# Patient Record
Sex: MALE | Race: BLACK OR AFRICAN AMERICAN | NOT HISPANIC OR LATINO | Employment: OTHER | URBAN - METROPOLITAN AREA
[De-identification: names, ages, dates, MRNs, and addresses within clinical notes are randomized per-mention and may not be internally consistent; named-entity substitution may affect disease eponyms.]

---

## 2022-10-12 PROBLEM — E11.9 DIABETES: Status: ACTIVE | Noted: 2022-10-12

## 2022-10-12 PROBLEM — I10 HYPERTENSION: Chronic | Status: ACTIVE | Noted: 2022-10-12

## 2022-10-12 PROBLEM — I63.519 CEREBROVASCULAR ACCIDENT (CVA) DUE TO OCCLUSION OF MIDDLE CEREBRAL ARTERY: Status: ACTIVE | Noted: 2022-10-12

## 2022-10-14 PROBLEM — N17.9 AKI (ACUTE KIDNEY INJURY): Status: ACTIVE | Noted: 2022-10-14

## 2022-10-17 PROBLEM — J38.4 LARYNGEAL EDEMA: Status: ACTIVE | Noted: 2022-10-17

## 2022-10-17 PROBLEM — N17.9 AKI (ACUTE KIDNEY INJURY): Status: RESOLVED | Noted: 2022-10-14 | Resolved: 2022-10-17

## 2022-10-17 PROBLEM — I63.512 ACUTE ISCHEMIC LEFT MCA STROKE: Status: ACTIVE | Noted: 2022-10-17

## 2022-10-17 PROBLEM — R47.01 APHASIA: Status: ACTIVE | Noted: 2022-10-17

## 2022-10-17 PROBLEM — Z99.11 ON MECHANICALLY ASSISTED VENTILATION: Status: ACTIVE | Noted: 2022-10-17

## 2022-10-17 PROBLEM — I61.0 NONTRAUMATIC SUBCORTICAL HEMORRHAGE OF LEFT CEREBRAL HEMISPHERE: Status: ACTIVE | Noted: 2022-10-17

## 2022-10-17 PROBLEM — E66.01 CLASS 2 SEVERE OBESITY DUE TO EXCESS CALORIES WITH SERIOUS COMORBIDITY AND BODY MASS INDEX (BMI) OF 36.0 TO 36.9 IN ADULT: Status: ACTIVE | Noted: 2022-10-17

## 2022-10-17 PROBLEM — G81.91 ACUTE RIGHT HEMIPARESIS: Status: ACTIVE | Noted: 2022-10-17

## 2022-10-17 PROBLEM — R41.4 HEMINEGLECT: Status: ACTIVE | Noted: 2022-10-17

## 2022-10-20 PROBLEM — R63.39 FEEDING DIFFICULTY IN ADULT: Status: ACTIVE | Noted: 2022-10-20

## 2022-10-20 PROBLEM — I69.391 DYSPHAGIA DUE TO RECENT CEREBROVASCULAR ACCIDENT: Status: ACTIVE | Noted: 2022-10-20

## 2022-10-20 PROBLEM — E87.0 HYPERNATREMIA: Status: ACTIVE | Noted: 2022-10-20

## 2022-10-21 PROBLEM — J69.0 ASPIRATION PNEUMONIA: Status: ACTIVE | Noted: 2022-10-21

## 2022-10-22 PROBLEM — A41.9 SEVERE SEPSIS: Status: ACTIVE | Noted: 2022-10-22

## 2022-10-22 PROBLEM — R73.9 HYPERGLYCEMIA: Status: ACTIVE | Noted: 2022-10-22

## 2022-10-22 PROBLEM — R40.2430 GLASGOW COMA SCALE TOTAL SCORE 3-8: Status: ACTIVE | Noted: 2022-10-22

## 2022-10-22 PROBLEM — R65.20 SEVERE SEPSIS: Status: ACTIVE | Noted: 2022-10-22

## 2022-10-24 PROBLEM — I10 PRIMARY HYPERTENSION: Status: ACTIVE | Noted: 2022-10-24

## 2022-10-24 PROBLEM — J96.01 ACUTE HYPOXEMIC RESPIRATORY FAILURE: Status: ACTIVE | Noted: 2022-10-24

## 2022-10-24 PROBLEM — I63.9 CRYPTOGENIC STROKE: Status: ACTIVE | Noted: 2022-10-17

## 2022-10-25 PROBLEM — I63.9 CRYPTOGENIC STROKE: Status: RESOLVED | Noted: 2022-10-17 | Resolved: 2022-10-25

## 2022-10-25 PROBLEM — Z99.11 ON MECHANICALLY ASSISTED VENTILATION: Status: RESOLVED | Noted: 2022-10-17 | Resolved: 2022-10-25

## 2022-10-25 PROBLEM — J38.4 LARYNGEAL EDEMA: Status: RESOLVED | Noted: 2022-10-17 | Resolved: 2022-10-25

## 2022-10-25 PROBLEM — R41.4 HEMINEGLECT: Status: RESOLVED | Noted: 2022-10-17 | Resolved: 2022-10-25

## 2022-10-25 PROBLEM — R63.39 FEEDING DIFFICULTY IN ADULT: Status: RESOLVED | Noted: 2022-10-20 | Resolved: 2022-10-25

## 2022-10-25 PROBLEM — R40.2430 GLASGOW COMA SCALE TOTAL SCORE 3-8: Status: RESOLVED | Noted: 2022-10-22 | Resolved: 2022-10-25

## 2022-10-25 PROBLEM — R73.9 HYPERGLYCEMIA: Status: RESOLVED | Noted: 2022-10-22 | Resolved: 2022-10-25

## 2022-10-27 PROBLEM — R65.21 SEPTIC SHOCK: Status: ACTIVE | Noted: 2022-10-22

## 2022-10-27 PROBLEM — R13.10 DYSPHAGIA: Status: ACTIVE | Noted: 2022-10-20

## 2022-10-29 PROBLEM — R31.0 GROSS HEMATURIA: Status: ACTIVE | Noted: 2022-10-29

## 2022-10-30 PROBLEM — Z93.1 S/P PERCUTANEOUS ENDOSCOPIC GASTROSTOMY (PEG) TUBE PLACEMENT: Status: ACTIVE | Noted: 2022-10-30

## 2022-11-01 PROBLEM — R65.21 SEPTIC SHOCK: Status: RESOLVED | Noted: 2022-10-22 | Resolved: 2022-11-01

## 2022-11-01 PROBLEM — J96.01 ACUTE HYPOXEMIC RESPIRATORY FAILURE: Status: RESOLVED | Noted: 2022-10-24 | Resolved: 2022-11-01

## 2022-11-01 PROBLEM — A41.9 SEPTIC SHOCK: Status: RESOLVED | Noted: 2022-10-22 | Resolved: 2022-11-01

## 2023-02-06 PROBLEM — J69.0 ASPIRATION PNEUMONIA: Status: RESOLVED | Noted: 2022-10-21 | Resolved: 2023-02-06

## 2023-02-06 PROBLEM — I63.512 ACUTE ISCHEMIC LEFT MCA STROKE: Status: RESOLVED | Noted: 2022-10-17 | Resolved: 2023-02-06

## 2023-02-06 PROBLEM — I69.391 DYSPHAGIA DUE TO RECENT CEREBROVASCULAR ACCIDENT: Status: RESOLVED | Noted: 2022-10-20 | Resolved: 2023-02-06

## 2023-02-06 PROBLEM — I63.519 CEREBROVASCULAR ACCIDENT (CVA) DUE TO OCCLUSION OF MIDDLE CEREBRAL ARTERY: Status: RESOLVED | Noted: 2022-10-12 | Resolved: 2023-02-06

## 2024-01-25 ENCOUNTER — HOSPITAL ENCOUNTER (INPATIENT)
Facility: HOSPITAL | Age: 64
LOS: 5 days | Discharge: HOSPICE | DRG: 871 | End: 2024-01-30
Attending: EMERGENCY MEDICINE | Admitting: INTERNAL MEDICINE
Payer: MEDICARE

## 2024-01-25 DIAGNOSIS — T83.511A URINARY TRACT INFECTION ASSOCIATED WITH CATHETERIZATION OF URINARY TRACT, UNSPECIFIED INDWELLING URINARY CATHETER TYPE, INITIAL ENCOUNTER: Primary | ICD-10-CM

## 2024-01-25 DIAGNOSIS — A41.9 SEPSIS, DUE TO UNSPECIFIED ORGANISM, UNSPECIFIED WHETHER ACUTE ORGAN DYSFUNCTION PRESENT: ICD-10-CM

## 2024-01-25 DIAGNOSIS — Z13.9 SCREENING DUE: ICD-10-CM

## 2024-01-25 DIAGNOSIS — N39.0 URINARY TRACT INFECTION ASSOCIATED WITH CATHETERIZATION OF URINARY TRACT, UNSPECIFIED INDWELLING URINARY CATHETER TYPE, INITIAL ENCOUNTER: Primary | ICD-10-CM

## 2024-01-25 DIAGNOSIS — R94.31 QT PROLONGATION: ICD-10-CM

## 2024-01-25 LAB
ALBUMIN SERPL-MCNC: 1.8 G/DL (ref 3.4–4.8)
ALBUMIN/GLOB SERPL: 0.3 RATIO (ref 1.1–2)
ALP SERPL-CCNC: 114 UNIT/L (ref 40–150)
ALT SERPL-CCNC: 12 UNIT/L (ref 0–55)
APPEARANCE UR: CLEAR
AST SERPL-CCNC: 20 UNIT/L (ref 5–34)
BACTERIA #/AREA URNS AUTO: ABNORMAL /HPF
BILIRUB SERPL-MCNC: 0.7 MG/DL
BILIRUB UR QL STRIP.AUTO: NEGATIVE
BNP BLD-MCNC: <10 PG/ML
BUN SERPL-MCNC: 23.7 MG/DL (ref 8.4–25.7)
CALCIUM SERPL-MCNC: 8.7 MG/DL (ref 8.8–10)
CHLORIDE SERPL-SCNC: 110 MMOL/L (ref 98–107)
CHOLEST SERPL-MCNC: 77 MG/DL
CHOLEST/HDLC SERPL: 2 {RATIO} (ref 0–5)
CK MB SERPL-MCNC: 2.6 NG/ML
CK SERPL-CCNC: 515 U/L (ref 30–200)
CO2 SERPL-SCNC: 20 MMOL/L (ref 23–31)
COLOR UR AUTO: YELLOW
CREAT SERPL-MCNC: 0.49 MG/DL (ref 0.73–1.18)
CRP SERPL-MCNC: 269.9 MG/L
ERYTHROCYTE [SEDIMENTATION RATE] IN BLOOD: >130 MM/HR (ref 0–15)
EST. AVERAGE GLUCOSE BLD GHB EST-MCNC: 122.6 MG/DL
GFR SERPLBLD CREATININE-BSD FMLA CKD-EPI: >60 MLS/MIN/1.73/M2
GLOBULIN SER-MCNC: 5.9 GM/DL (ref 2.4–3.5)
GLUCOSE SERPL-MCNC: 88 MG/DL (ref 82–115)
GLUCOSE UR QL STRIP.AUTO: ABNORMAL
HBA1C MFR BLD: 5.9 %
HDLC SERPL-MCNC: 34 MG/DL (ref 35–60)
HOLD SPECIMEN: NORMAL
HYALINE CASTS #/AREA URNS LPF: ABNORMAL /LPF
KETONES UR QL STRIP.AUTO: ABNORMAL
LACTATE SERPL-SCNC: 1.5 MMOL/L (ref 0.5–2.2)
LDLC SERPL CALC-MCNC: 33 MG/DL (ref 50–140)
LEUKOCYTE ESTERASE UR QL STRIP.AUTO: 250
LIPASE SERPL-CCNC: <4 U/L
MAGNESIUM SERPL-MCNC: 2.2 MG/DL (ref 1.6–2.6)
MUCOUS THREADS URNS QL MICRO: ABNORMAL /LPF
NITRITE UR QL STRIP.AUTO: NEGATIVE
PH UR STRIP.AUTO: 5.5 [PH]
POCT GLUCOSE: 130 MG/DL (ref 70–110)
POCT GLUCOSE: 93 MG/DL (ref 70–110)
POTASSIUM SERPL-SCNC: 3.9 MMOL/L (ref 3.5–5.1)
PROT SERPL-MCNC: 7.7 GM/DL (ref 5.8–7.6)
PROT UR QL STRIP.AUTO: ABNORMAL
RBC #/AREA URNS AUTO: ABNORMAL /HPF
RBC UR QL AUTO: NEGATIVE
SODIUM SERPL-SCNC: 142 MMOL/L (ref 136–145)
SP GR UR STRIP.AUTO: 1.03 (ref 1–1.03)
SQUAMOUS #/AREA URNS LPF: ABNORMAL /HPF
TRIGL SERPL-MCNC: 48 MG/DL (ref 34–140)
TROPONIN I SERPL-MCNC: 0.04 NG/ML (ref 0–0.04)
UNIDENT CRYS #/AREA URNS HPF: ABNORMAL /HPF
UROBILINOGEN UR STRIP-ACNC: ABNORMAL
VLDLC SERPL CALC-MCNC: 10 MG/DL
WBC #/AREA URNS AUTO: ABNORMAL /HPF
YEAST BUDDING URNS QL: ABNORMAL /HPF

## 2024-01-25 PROCEDURE — 85652 RBC SED RATE AUTOMATED: CPT | Performed by: EMERGENCY MEDICINE

## 2024-01-25 PROCEDURE — 80053 COMPREHEN METABOLIC PANEL: CPT | Performed by: EMERGENCY MEDICINE

## 2024-01-25 PROCEDURE — 82553 CREATINE MB FRACTION: CPT | Performed by: EMERGENCY MEDICINE

## 2024-01-25 PROCEDURE — 87040 BLOOD CULTURE FOR BACTERIA: CPT

## 2024-01-25 PROCEDURE — 96360 HYDRATION IV INFUSION INIT: CPT

## 2024-01-25 PROCEDURE — 25000003 PHARM REV CODE 250

## 2024-01-25 PROCEDURE — 93005 ELECTROCARDIOGRAM TRACING: CPT

## 2024-01-25 PROCEDURE — 86140 C-REACTIVE PROTEIN: CPT | Performed by: EMERGENCY MEDICINE

## 2024-01-25 PROCEDURE — 81001 URINALYSIS AUTO W/SCOPE: CPT

## 2024-01-25 PROCEDURE — 25000003 PHARM REV CODE 250: Performed by: EMERGENCY MEDICINE

## 2024-01-25 PROCEDURE — 21400001 HC TELEMETRY ROOM

## 2024-01-25 PROCEDURE — 99285 EMERGENCY DEPT VISIT HI MDM: CPT | Mod: 25

## 2024-01-25 PROCEDURE — 87077 CULTURE AEROBIC IDENTIFY: CPT

## 2024-01-25 PROCEDURE — 63600175 PHARM REV CODE 636 W HCPCS

## 2024-01-25 PROCEDURE — 11000001 HC ACUTE MED/SURG PRIVATE ROOM

## 2024-01-25 PROCEDURE — 80061 LIPID PANEL: CPT

## 2024-01-25 PROCEDURE — 83036 HEMOGLOBIN GLYCOSYLATED A1C: CPT

## 2024-01-25 PROCEDURE — 82550 ASSAY OF CK (CPK): CPT | Performed by: EMERGENCY MEDICINE

## 2024-01-25 PROCEDURE — 25000003 PHARM REV CODE 250: Performed by: STUDENT IN AN ORGANIZED HEALTH CARE EDUCATION/TRAINING PROGRAM

## 2024-01-25 PROCEDURE — 84484 ASSAY OF TROPONIN QUANT: CPT | Performed by: EMERGENCY MEDICINE

## 2024-01-25 PROCEDURE — 83735 ASSAY OF MAGNESIUM: CPT | Performed by: EMERGENCY MEDICINE

## 2024-01-25 PROCEDURE — 83880 ASSAY OF NATRIURETIC PEPTIDE: CPT | Performed by: EMERGENCY MEDICINE

## 2024-01-25 PROCEDURE — 83605 ASSAY OF LACTIC ACID: CPT | Performed by: EMERGENCY MEDICINE

## 2024-01-25 PROCEDURE — 83690 ASSAY OF LIPASE: CPT | Performed by: EMERGENCY MEDICINE

## 2024-01-25 PROCEDURE — 84134 ASSAY OF PREALBUMIN: CPT | Performed by: STUDENT IN AN ORGANIZED HEALTH CARE EDUCATION/TRAINING PROGRAM

## 2024-01-25 RX ORDER — SODIUM CHLORIDE 0.9 % (FLUSH) 0.9 %
10 SYRINGE (ML) INJECTION
Status: DISCONTINUED | OUTPATIENT
Start: 2024-01-25 | End: 2024-01-30 | Stop reason: HOSPADM

## 2024-01-25 RX ORDER — ATORVASTATIN CALCIUM 40 MG/1
40 TABLET, FILM COATED ORAL DAILY
Status: DISCONTINUED | OUTPATIENT
Start: 2024-01-25 | End: 2024-01-26

## 2024-01-25 RX ORDER — SODIUM CHLORIDE, SODIUM LACTATE, POTASSIUM CHLORIDE, CALCIUM CHLORIDE 600; 310; 30; 20 MG/100ML; MG/100ML; MG/100ML; MG/100ML
INJECTION, SOLUTION INTRAVENOUS CONTINUOUS
Status: DISCONTINUED | OUTPATIENT
Start: 2024-01-25 | End: 2024-01-30 | Stop reason: HOSPADM

## 2024-01-25 RX ORDER — HYDROCODONE BITARTRATE AND ACETAMINOPHEN 5; 325 MG/1; MG/1
1 TABLET ORAL EVERY 4 HOURS PRN
Status: DISCONTINUED | OUTPATIENT
Start: 2024-01-25 | End: 2024-01-30 | Stop reason: HOSPADM

## 2024-01-25 RX ORDER — CITALOPRAM 10 MG/1
20 TABLET ORAL DAILY
Status: DISCONTINUED | OUTPATIENT
Start: 2024-01-26 | End: 2024-01-26

## 2024-01-25 RX ORDER — METOPROLOL TARTRATE 25 MG/1
25 TABLET, FILM COATED ORAL 2 TIMES DAILY
Status: DISCONTINUED | OUTPATIENT
Start: 2024-01-26 | End: 2024-01-26

## 2024-01-25 RX ORDER — ACETAMINOPHEN 325 MG/1
650 TABLET ORAL EVERY 8 HOURS PRN
Status: DISCONTINUED | OUTPATIENT
Start: 2024-01-25 | End: 2024-01-30 | Stop reason: HOSPADM

## 2024-01-25 RX ORDER — GABAPENTIN 300 MG/1
600 CAPSULE ORAL 2 TIMES DAILY
Status: DISCONTINUED | OUTPATIENT
Start: 2024-01-26 | End: 2024-01-26

## 2024-01-25 RX ORDER — NAPROXEN SODIUM 220 MG/1
81 TABLET, FILM COATED ORAL DAILY
Status: DISCONTINUED | OUTPATIENT
Start: 2024-01-26 | End: 2024-01-26

## 2024-01-25 RX ORDER — IBUPROFEN 200 MG
24 TABLET ORAL
Status: DISCONTINUED | OUTPATIENT
Start: 2024-01-25 | End: 2024-01-30 | Stop reason: HOSPADM

## 2024-01-25 RX ORDER — INSULIN ASPART 100 [IU]/ML
0-5 INJECTION, SOLUTION INTRAVENOUS; SUBCUTANEOUS
Status: DISCONTINUED | OUTPATIENT
Start: 2024-01-25 | End: 2024-01-30 | Stop reason: HOSPADM

## 2024-01-25 RX ORDER — IBUPROFEN 200 MG
16 TABLET ORAL
Status: DISCONTINUED | OUTPATIENT
Start: 2024-01-25 | End: 2024-01-30 | Stop reason: HOSPADM

## 2024-01-25 RX ORDER — GLUCAGON 1 MG
1 KIT INJECTION
Status: DISCONTINUED | OUTPATIENT
Start: 2024-01-25 | End: 2024-01-30 | Stop reason: HOSPADM

## 2024-01-25 RX ORDER — TALC
6 POWDER (GRAM) TOPICAL NIGHTLY PRN
Status: DISCONTINUED | OUTPATIENT
Start: 2024-01-25 | End: 2024-01-30 | Stop reason: HOSPADM

## 2024-01-25 RX ORDER — LISINOPRIL 2.5 MG/1
2.5 TABLET ORAL DAILY
Status: DISCONTINUED | OUTPATIENT
Start: 2024-01-26 | End: 2024-01-26

## 2024-01-25 RX ORDER — MUPIROCIN 20 MG/G
OINTMENT TOPICAL 2 TIMES DAILY
Status: COMPLETED | OUTPATIENT
Start: 2024-01-25 | End: 2024-01-30

## 2024-01-25 RX ADMIN — SODIUM CHLORIDE, POTASSIUM CHLORIDE, SODIUM LACTATE AND CALCIUM CHLORIDE: 600; 310; 30; 20 INJECTION, SOLUTION INTRAVENOUS at 12:01

## 2024-01-25 RX ADMIN — MUPIROCIN 1 G: 20 OINTMENT TOPICAL at 08:01

## 2024-01-25 RX ADMIN — ATORVASTATIN CALCIUM 40 MG: 40 TABLET, FILM COATED ORAL at 12:01

## 2024-01-25 RX ADMIN — SODIUM CHLORIDE 1000 ML: 9 INJECTION, SOLUTION INTRAVENOUS at 08:01

## 2024-01-25 RX ADMIN — INSULIN DETEMIR 5 UNITS: 100 INJECTION, SOLUTION SUBCUTANEOUS at 08:01

## 2024-01-25 RX ADMIN — PIPERACILLIN SODIUM AND TAZOBACTAM SODIUM 4.5 G: 4; .5 INJECTION, POWDER, LYOPHILIZED, FOR SOLUTION INTRAVENOUS at 04:01

## 2024-01-25 RX ADMIN — VANCOMYCIN HYDROCHLORIDE 2000 MG: 500 INJECTION, POWDER, LYOPHILIZED, FOR SOLUTION INTRAVENOUS at 04:01

## 2024-01-25 NOTE — H&P
[facility] Medicine Wards History & Physical Note     Resident Team: [facility] Medicine List 3  Attending Physician: Ba Lee MD  Resident: Art Saunders  Intern: Chano Ely    Date of Admit: 1/25/2024    Chief Complaint     Skin Ulcer and urinary problems (Transferred from [place] due to sacral ulcer and uti. Yadav present upon arrival with cloudy urine and also sacral ulcer noted. O2 at 88% RA. O2 @ 2l/nc in place ems. )     Subjective:      History of Present Illness:  Fermin Garcia is a 64 y.o. male with a past medical history of CVA (2020), aphasia, hypertension, neurogenic who was transferred to our emergency department [facility] for surgical services.  Patient was sent to outside ED from SNF due to altered mental and hypotension.  On review of paperwork from outside facility, it appears that patient presented to the outside facility initially hypotensive at 88/55.  He was given 2 L NS with improvement in his blood pressure to 110/72.  Labs from the outside facility displayed a leukocytosis at 18 with left shift, microcytic anemia with a H/H of 9/29.6 (MCV 72), normal lactate of 1.9, elevated troponin 113.1 pg/mL.  UA indicative of UTI.  Chest x-ray interpreted no acute cardiopulmonary process.  CT abdomen and pelvis significant for large decubitus ulcer overlying distal aspect of sacrum with evidence of bony erosion and extension of infectious process into the presacral space.    On admission, patient awake and alert but aphasic and unable to provide any significant information about his condition.  Contacted patient's sister Kaila Trent ([phone]) stated the patient has been bed ridden in a nursing home stroke in 2020.  She states that sacral ulcer has been present for some time now and has been actively enlarging.  She was unable to provide more information on the patient's condition or current health status.    Past Medical History:  Past Medical History:   Diagnosis Date    Hypertension   "   Stroke        Past Surgical History:  History reviewed. No pertinent surgical history.    Family History:  History reviewed. No pertinent family history.    Social History:  Social History     Tobacco Use    Smoking status: Unknown   Substance Use Topics    Alcohol use: Not Currently    Drug use: Not Currently       Allergies:  Review of patient's allergies indicates:  No Known Allergies    Home Medications:  Prior to Admission medications    Medication Sig Start Date End Date Taking? Authorizing Provider   amitriptyline (ELAVIL) 25 MG tablet Take 25 mg by mouth every evening.    Provider, Historical   atorvastatin (LIPITOR) 40 MG tablet Take 40 mg by mouth once daily.    Provider, Historical   carvediloL (COREG) 12.5 MG tablet Take 12.5 mg by mouth 2 (two) times daily with meals.    Provider, Historical   EScitalopram oxalate (LEXAPRO) 10 MG tablet 1 tablet (10 mg total) by Per G Tube route nightly. 22  Marcelino Moreau MD   ezetimibe (ZETIA) 10 mg tablet Take 10 mg by mouth once daily.    Provider, Historical   gabapentin (NEURONTIN) 600 MG tablet Take 1 tablet (600 mg total) by mouth 2 (two) times daily. 22  Marcelino Moreau MD   insulin aspart U-100 (NOVOLOG) 100 unit/mL injection Inject 0-5 Units into the skin 3 (three) times daily with meals. 22  Marcelino Moreau MD   insulin detemir U-100 (LEVEMIR) 100 unit/mL injection Inject 17 Units into the skin once daily. 22  Marcelino Moreau MD   lisinopriL 10 MG tablet Take 10 mg by mouth once daily.    Provider, Historical     Review of Systems:  Review of Systems   Unable to perform ROS: Language       Objective:   Last 24 Hour Vital Signs:  BP  Min: 122/76  Max: 135/77  Temp  Av.6 °F (37 °C)  Min: 98.6 °F (37 °C)  Max: 98.6 °F (37 °C)  Pulse  Av  Min: 113  Max: 115  Resp  Av  Min: 24  Max: 24  SpO2  Av.8 %  Min: 97 %  Max: 99 %  Height  Av' 8" (172.7 cm)  Min: 5' 8" (172.7 cm)  Max: " "5' 8" (172.7 cm)  Weight  Av.2 kg (185 lb 11.1 oz)  Min: 83.5 kg (184 lb)  Max: 85 kg (187 lb 6.3 oz)  Body mass index is 27.98 kg/m².  No intake/output data recorded.    Physical Examination:  Physical Exam  Vitals reviewed.   Constitutional:       General: He is not in acute distress.  Cardiovascular:      Rate and Rhythm: Regular rhythm. Tachycardia present.      Pulses: Normal pulses.      Heart sounds: No murmur heard.     No friction rub. No gallop.   Pulmonary:      Effort: Pulmonary effort is normal.      Breath sounds: Rhonchi present. No wheezing or rales.   Abdominal:      General: There is no distension.      Palpations: Abdomen is soft.      Tenderness: There is no abdominal tenderness.      Comments: PEG tube place   Musculoskeletal:      Comments: There is a large sacral ulcer with visible bone w/ no evidence of necrotic tissue   There are several wounds of bilateral lower extremities   Skin:     General: Skin is warm and dry.   Neurological:      Mental Status: He is alert.      Comments: Patient difficult to understand due to aphasia  Patient is able to answer yes/no questions   Psychiatric:         Mood and Affect: Mood normal.         Behavior: Behavior normal.       Laboratory:  Most Recent Data:  CBC:   Lab Results   Component Value Date    WBC 11.14 2022    HGB 11.7 (L) 2022    HCT 36.3 (L) 2022     2022    MCV 82 2022    RDW 12.2 2022     WBC Differential: No results for input(s): "WBC", "HGB", "HCT", "PLT", "MCV" in the last 168 hours.  BMP:   Lab Results   Component Value Date     2024    K 3.9 2024     2022    CO2 20 (L) 2024    BUN 23.7 2024    CREATININE 0.49 (L) 2024     (H) 2022    CALCIUM 8.7 (L) 2024    MG 2.20 2024    PHOS 3.8 2022     LFTs:   Lab Results   Component Value Date    PROT 6.3 10/26/2022    ALBUMIN 1.8 (L) 2024    BILITOT 0.7 2024    " "AST 20 01/25/2024    ALKPHOS 114 01/25/2024    ALT 12 01/25/2024     Coags:   Lab Results   Component Value Date    INR 1.17 10/20/2022     FLP:   Lab Results   Component Value Date    CHOL 238 (H) 10/13/2022    HDL 53 10/13/2022    LDLCALC 167.0 (H) 10/13/2022    TRIG 90 10/13/2022    CHOLHDL 22.3 10/13/2022     DM:   Lab Results   Component Value Date    HGBA1C 5.9 01/25/2024    HGBA1C 9.4 (H) 10/13/2022    LDLCALC 167.0 (H) 10/13/2022    CREATININE 0.49 (L) 01/25/2024     Thyroid: No results found for: "TSH", "FREET4", "I9QOZHJ", "I1IVXWG", "THYROIDAB"  Anemia:   Lab Results   Component Value Date    IOXJALPL20 727 10/13/2022    FOLATE 16.4 10/13/2022     Cardiac:   Lab Results   Component Value Date    TROPONINI 0.044 01/25/2024     Urinalysis:   Lab Results   Component Value Date    COLORU Yellow 10/22/2022    SPECGRAV >=1.030 (A) 10/22/2022    NITRITE Negative 10/22/2022    KETONESU Negative 10/22/2022    UROBILINOGEN 1.0 10/22/2022    WBCUA 4 10/22/2022       Trended Lab Data:  Recent Labs   Lab 01/25/24  1429      K 3.9   CO2 20*   BUN 23.7   CREATININE 0.49*   ALBUMIN 1.8*   BILITOT 0.7   AST 20   ALKPHOS 114   ALT 12       Trended Cardiac Data:  Recent Labs   Lab 01/25/24  1424   TROPONINI 0.044       Radiology:  Imaging Results              X-Ray Chest AP Portable (Final result)  Result time 01/25/24 08:36:43      Final result by Aneesh Ahumada MD (01/25/24 08:36:43)                   Impression:      No acute cardiopulmonary process.      Electronically signed by: Aneesh Ahumada  Date:    01/25/2024  Time:    08:36               Narrative:    EXAMINATION:  XR CHEST AP PORTABLE    CLINICAL HISTORY:  Encounter for screening, unspecified    TECHNIQUE:  Single view of the chest    COMPARISON:  10/21/2022    FINDINGS:  No focal opacification, pleural effusion, or pneumothorax.    The cardiomediastinal silhouette is within normal limits.    No acute osseous abnormality.                                "       Assessment & Plan:     1) Sepsis secondary to sacral ulcer vs. urosepsis    - SIRS 3/4; qSOFA 2/3 on admit  - patient with a large sacral ulcer and multiple wounds to bilateral lower extremities  -CT abdomen pelvis displayed ulcer with extension infectious processes to presacral space and bony erosion  - patient also noticed to have increased urine sediment in Yadav  - UA from outside ED indicative of UTI, repeating UA and urine culture  - patient originally hypotensive upon arrival to outside ED but responded to fluid resuscitation  - initial lactate 0.9  - leukocytosis 19 initially at outside facility, repeating labs  - wound care consulted  - patient started on broad-spectrum antibiotics with vancomycin and Zosyn (day 1)  - blood cultures ordered and pending  - we will consider surgery consult if family/patient opts for diverting colostomy    2) AMS      Hx of MCA stroke      Aphasia   - patient initially presented to the ED from nursing home due to altered mental status  - according to records from nursing home, this is patient's baseline (answer yes/no questions and is difficult to understand)  - obtain CT head, consider MRI brain  - we will continue aspirin if CT unremarkable    3) HTN  - currently holding home antihypertensives due to initial hypotension secondary to sepsis  - continue home lisinopril, metoprolol when appropriate    4) HLD  - continue home atorvastatin 40 mg daily  - repeat lipid panel ordered    5) T2DM  -SSI  -Detemir 5U    CODE STATUS: Full (has [place]POST)  Access:PIV  Antibiotics: Vanc and Zosyn  Diet: PEG feeds  DVT Prophylaxis: SCD  GI Prophylaxis: N/A  Fluids: LR @ 100 mL/hr     Disposition:  Patient admitted to the Internal Medicine team for sepsis secondary to sacral ulcer versus urosepsis.  Started on broad-spectrum antibiotics and blood cultures pending.    Chano Ely, DO  Internal Medicine HO-1

## 2024-01-25 NOTE — ED PROVIDER NOTES
Encounter Date: 1/25/2024       History     Chief Complaint   Patient presents with    Skin Ulcer    urinary problems     Transferred from [place] due to sacral ulcer and uti. Yadav present upon arrival with cloudy urine and also sacral ulcer noted. O2 at 88% RA. O2 @ 2l/nc in place ems.      Patient presented to OSH yesterday for hypotension in SNF; found obvious UTI and apparently infected sacral decubitus ulcer at OSH. Patient was improved with treatments targeting sepsis at first facility, but hospitalists there had been unwilling to accept patient to facility given the sacral wound and absence of surgery service on call.OSH data include significant leukocytosis, NSTEMI. Received 30 cc/kg crystalloid, zosyn (8pm) and vanco (3am). Arrives here eyes open, appropriate responses to simple questions, following simple commands ;        Review of patient's allergies indicates:  No Known Allergies  Past Medical History:   Diagnosis Date    Hypertension     Stroke      History reviewed. No pertinent surgical history.  History reviewed. No pertinent family history.  Social History     Tobacco Use    Smoking status: Unknown   Substance Use Topics    Alcohol use: Not Currently    Drug use: Not Currently     Review of Systems   Reason unable to perform ROS: mental status insufficient.       Physical Exam     Initial Vitals [01/25/24 0730]   BP Pulse Resp Temp SpO2   124/70 (!) 113 (!) 24 98.6 °F (37 °C) 97 %      MAP       --         Physical Exam    Nursing note and vitals reviewed.  Constitutional: He is not diaphoretic. No distress.   HENT:   Head: Normocephalic and atraumatic.   Eyes: EOM are normal. Pupils are equal, round, and reactive to light. Right eye exhibits no discharge. Left eye exhibits no discharge.   Neck: Neck supple. No thyromegaly present. No tracheal deviation present. No JVD present.   Normal range of motion.  Cardiovascular:  Regular rhythm, normal heart sounds and intact distal pulses.           No  murmur heard.  Pulmonary/Chest: No stridor. No respiratory distress. He has wheezes. He has no rhonchi. He has no rales.   Abdominal: Abdomen is soft. He exhibits no distension. There is no abdominal tenderness. There is no rebound and no guarding.   Musculoskeletal:         General: No tenderness or edema. Normal range of motion.      Cervical back: Normal range of motion and neck supple.     Neurological: He is alert and oriented to person, place, and time. He has normal strength. No cranial nerve deficit. GCS score is 15. GCS eye subscore is 4. GCS verbal subscore is 5. GCS motor subscore is 6.   Skin: Skin is warm and dry. Capillary refill takes less than 2 seconds. No rash and no abscess noted. No erythema. No pallor.   Stage IV sacral decubitus ulcer ;   Psychiatric: He has a normal mood and affect. His behavior is normal. Judgment and thought content normal.         ED Course   Critical Care    Date/Time: 1/25/2024 4:58 PM    Performed by: Mario Romano MD  Authorized by: Ba Lee MD  Direct patient critical care time: 30 minutes  Additional history critical care time: 5 minutes  Ordering / reviewing critical care time: 5 minutes  Documentation critical care time: 5 minutes  Consulting other physicians critical care time: 5 minutes  Total critical care time (exclusive of procedural time) : 50 minutes  Critical care time was exclusive of separately billable procedures and treating other patients and teaching time.  Critical care was necessary to treat or prevent imminent or life-threatening deterioration of the following conditions: circulatory failure and shock.  Critical care was time spent personally by me on the following activities: blood draw for specimens, development of treatment plan with patient or surrogate, discussions with consultants, discussions with primary provider, interpretation of cardiac output measurements, evaluation of patient's response to treatment, examination of  patient, obtaining history from patient or surrogate, ordering and performing treatments and interventions, ordering and review of laboratory studies, ordering and review of radiographic studies, pulse oximetry, re-evaluation of patient's condition and review of old charts.        Labs Reviewed   BLOOD CULTURE OLG   BLOOD CULTURE OLG   CBC W/ AUTO DIFFERENTIAL    Narrative:     The following orders were created for panel order CBC auto differential.  Procedure                               Abnormality         Status                     ---------                               -----------         ------                     CBC with Differential[[id number]]                                                        Please view results for these tests on the individual orders.   CBC WITH DIFFERENTIAL   URINALYSIS, REFLEX TO URINE CULTURE   POCT GLUCOSE MONITORING CONTINUOUS     EKG Readings: (Independently Interpreted)   - ST @ 114, non acute and non ischemic appearing ;     ECG Results              EKG 12-lead (In process)  Result time 01/25/24 08:11:54      In process by Interface, Lab In [facility] (01/25/24 08:11:54)                   Narrative:    Test Reason : Z13.9,    Vent. Rate : 114 BPM     Atrial Rate : 114 BPM     P-R Int : 162 ms          QRS Dur : 074 ms      QT Int : 334 ms       P-R-T Axes : 064 020 076 degrees     QTc Int : 460 ms    Sinus tachycardia  Cannot rule out Inferior infarct (cited on or before 25-JAN-2024)  T wave abnormality, consider anterolateral ischemia  Abnormal ECG  When compared with ECG of 18-OCT-2022 05:23,  Vent. rate has increased BY  50 BPM  Questionable change in initial forces of Inferior leads  Nonspecific T wave abnormality now evident in Inferior leads  T wave inversion now evident in Anterior-lateral leads    Referred By: HELLEN PHILLIPS           Confirmed By:                                   Imaging Results              X-Ray Chest AP Portable (Final result)  Result time  01/25/24 08:36:43      Final result by Aneesh Ahumada MD (01/25/24 08:36:43)                   Impression:      No acute cardiopulmonary process.      Electronically signed by: Aneesh Ahumada  Date:    01/25/2024  Time:    08:36               Narrative:    EXAMINATION:  XR CHEST AP PORTABLE    CLINICAL HISTORY:  Encounter for screening, unspecified    TECHNIQUE:  Single view of the chest    COMPARISON:  10/21/2022    FINDINGS:  No focal opacification, pleural effusion, or pneumothorax.    The cardiomediastinal silhouette is within normal limits.    No acute osseous abnormality.                                    X-Rays:   Independently Interpreted Readings:   Other Readings:  - OSH imaging reviewed; observe significant sacral decubitae ;    Medications   sodium chloride 0.9% flush 10 mL (has no administration in time range)   melatonin tablet 6 mg (has no administration in time range)   acetaminophen tablet 650 mg (has no administration in time range)   HYDROcodone-acetaminophen 5-325 mg per tablet 1 tablet (has no administration in time range)   atorvastatin tablet 40 mg (40 mg Oral Given 1/25/24 1234)   glucose chewable tablet 16 g (has no administration in time range)   glucose chewable tablet 24 g (has no administration in time range)   glucagon (human recombinant) injection 1 mg (has no administration in time range)   insulin aspart U-100 injection 0-5 Units (has no administration in time range)   dextrose 10% bolus 125 mL 125 mL (has no administration in time range)   dextrose 10% bolus 250 mL 250 mL (has no administration in time range)   lactated ringers infusion ( Intravenous New Bag 1/25/24 1235)   piperacillin-tazobactam (ZOSYN) 4.5 g in dextrose 5 % in water (D5W) 100 mL IVPB (MB+) (4.5 g Intravenous New Bag 1/25/24 1624)   aspirin chewable tablet 81 mg (has no administration in time range)   mupirocin 2 % ointment (has no administration in time range)   citalopram tablet 20 mg (has no administration  in time range)   gabapentin capsule 600 mg (has no administration in time range)   lisinopriL tablet 2.5 mg (has no administration in time range)   metoprolol tartrate (LOPRESSOR) tablet 25 mg (has no administration in time range)   insulin detemir U-100 injection 5 Units (has no administration in time range)   vancomycin (VANCOCIN) 2,000 mg in dextrose 5 % (D5W) 500 mL IVPB (2,000 mg Intravenous New Bag 1/25/24 1629)   vancomycin 1,500 mg in dextrose 5 % (D5W) 250 mL IVPB (Vial-Mate) (has no administration in time range)   sodium chloride 0.9% bolus 1,000 mL 1,000 mL (0 mLs Intravenous Stopped 1/25/24 0921)     Medical Decision Making  Patient presents with features suggestive of severe sepsis in setting of probable urinary source. Ddc includes urosepsis, renal failure, possible sepsis from sacral decubitus ulcer, SIRS, others ...    Amount and/or Complexity of Data Reviewed  Independent Historian: EMS  External Data Reviewed: radiology and notes.  Labs: ordered. Decision-making details documented in ED Course.  Radiology: ordered.  ECG/medicine tests: ordered and independent interpretation performed. Decision-making details documented in ED Course.     Details: - ST @ 114, non acute and non ischemic appearing ;  Discussion of management or test interpretation with external provider(s): In patient medicine team aware of case, plan admit ;    Risk  Decision regarding hospitalization.  Risk Details: Risk found sufficient to warrant admission for further evaluation, supervision of clinical course ;               ED Course as of 01/25/24 1704   Thu Jan 25, 2024   1657 Significantly elevated crp ; [CT]   1657 Moderately elevated cpk ; [CT]   1657 Reassuring chemistries ; [CT]   1657 Significantly elevated CRP ; [CT]      ED Course User Index  [CT] Mario Romano MD                           Clinical Impression:  Final diagnoses:  [Z13.9] Screening due  [T83.511A, N39.0] Urinary tract infection associated with  catheterization of urinary tract, unspecified indwelling urinary catheter type, initial encounter (Primary)  [A41.9] Sepsis, due to unspecified organism, unspecified whether acute organ dysfunction present          ED Disposition Condition    Admit                 Mario Romano MD  01/25/24 8092

## 2024-01-25 NOTE — PROGRESS NOTES
"Pharmacokinetic Initial Assessment: IV Vancomycin    Assessment/Plan:    Initiate intravenous vancomycin with loading dose of 2000 mg once followed by a maintenance dose of vancomycin 1500mg IV every 12 hours  Desired empiric serum trough concentration is 15 to 20 mcg/mL  Draw vancomycin trough level 60 min prior to fourth dose on 1/27 at approximately 0400  Pharmacy will continue to follow and monitor vancomycin.      Please contact pharmacy at extension [phone] with any questions regarding this assessment.     Thank you for the consult,   Mariza Fields       Patient brief summary:  Fermin Garcia is a 64 y.o. male initiated on antimicrobial therapy with IV Vancomycin for treatment of suspected bacteremia    Drug Allergies:   Review of patient's allergies indicates:  No Known Allergies    Actual Body Weight:   83.5kg    Renal Function:   Estimated Creatinine Clearance: 156.1 mL/min (A) (based on SCr of 0.49 mg/dL (L)).,     CBC (last 72 hours):  Recent Labs   Lab Result Units 01/25/24  1424   Hemoglobin A1c % 5.9       Metabolic Panel (last 72 hours):  Recent Labs   Lab Result Units 01/25/24  1429   Sodium Level mmol/L 142   Potassium Level mmol/L 3.9   Chloride mmol/L 110*   Carbon Dioxide mmol/L 20*   Glucose Level mg/dL 88   Blood Urea Nitrogen mg/dL 23.7   Creatinine mg/dL 0.49*   Albumin Level g/dL 1.8*   Bilirubin Total mg/dL 0.7   Alkaline Phosphatase unit/L 114   Aspartate Aminotransferase unit/L 20   Alanine Aminotransferase unit/L 12   Magnesium Level mg/dL 2.20       Drug levels (last 3 results):  No results for input(s): "VANCOMYCINRA", "VANCORANDOM", "VANCOMYCINPE", "VANCOPEAK", "VANCOMYCINTR", "VANCOTROUGH" in the last 72 hours.    Microbiologic Results:  Microbiology Results (last 7 days)       Procedure Component Value Units Date/Time    Blood Culture #2 **CANNOT BE ORDERED STAT** [[id number]] Collected: 01/25/24 1421    Order Status: Sent Specimen: Blood from Hand, Left Updated: 01/25/24 1427 "    Blood Culture #1 **CANNOT BE ORDERED STAT** [[id number]] Collected: 01/25/24 1346    Order Status: Sent Specimen: Blood from Hand, Right Updated: 01/25/24 1427    Blood Culture #2 **CANNOT BE ORDERED STAT** [[id number]]     Order Status: Canceled Specimen: Blood, Venous     Blood Culture #1 **CANNOT BE ORDERED STAT** [[id number]]     Order Status: Canceled Specimen: Blood, Venous

## 2024-01-25 NOTE — ED NOTES
I SPOKE TO JUAN C IN PHARM. HE SAID DO NOT GIVE ANTIBIOTICS  YET DUE TO BLOOD CULTURES NOT DONE YET AND UNSURE OF HOW WE WILL OBTAIN BLOOD CULTURE RESULTS FROM [facility].

## 2024-01-26 LAB
ALBUMIN SERPL-MCNC: 1.6 G/DL (ref 3.4–4.8)
ALBUMIN/GLOB SERPL: 0.3 RATIO (ref 1.1–2)
ALP SERPL-CCNC: 115 UNIT/L (ref 40–150)
ALT SERPL-CCNC: 10 UNIT/L (ref 0–55)
ANION GAP SERPL CALC-SCNC: 8 MEQ/L
ANION GAP SERPL CALC-SCNC: 9 MEQ/L
AST SERPL-CCNC: 18 UNIT/L (ref 5–34)
AV INDEX (PROSTH): 0.51
AV MEAN GRADIENT: 12 MMHG
AV PEAK GRADIENT: 25 MMHG
AV VALVE AREA BY VELOCITY RATIO: 1.4 CM²
AV VALVE AREA: 1.54 CM²
AV VELOCITY RATIO: 0.46
BASOPHILS # BLD AUTO: 0.04 X10(3)/MCL
BASOPHILS NFR BLD AUTO: 0.2 %
BILIRUB SERPL-MCNC: 0.6 MG/DL
BSA FOR ECHO PROCEDURE: 2 M2
BUN SERPL-MCNC: 11.2 MG/DL (ref 8.4–25.7)
BUN SERPL-MCNC: 15.9 MG/DL (ref 8.4–25.7)
BUN SERPL-MCNC: 8.1 MG/DL (ref 8.4–25.7)
CALCIUM SERPL-MCNC: 8 MG/DL (ref 8.8–10)
CALCIUM SERPL-MCNC: 8.3 MG/DL (ref 8.8–10)
CALCIUM SERPL-MCNC: 8.5 MG/DL (ref 8.8–10)
CHLORIDE SERPL-SCNC: 105 MMOL/L (ref 98–107)
CHLORIDE SERPL-SCNC: 108 MMOL/L (ref 98–107)
CHLORIDE SERPL-SCNC: 111 MMOL/L (ref 98–107)
CO2 SERPL-SCNC: 21 MMOL/L (ref 23–31)
CO2 SERPL-SCNC: 23 MMOL/L (ref 23–31)
CO2 SERPL-SCNC: 25 MMOL/L (ref 23–31)
CREAT SERPL-MCNC: 0.44 MG/DL (ref 0.73–1.18)
CREAT SERPL-MCNC: 0.49 MG/DL (ref 0.73–1.18)
CREAT SERPL-MCNC: 0.5 MG/DL (ref 0.73–1.18)
CREAT/UREA NIT SERPL: 18
CREAT/UREA NIT SERPL: 22
CRP SERPL-MCNC: 204.4 MG/L
CV ECHO LV RWT: 0.72 CM
DOP CALC AO PEAK VEL: 2.51 M/S
DOP CALC AO VTI: 39.9 CM
DOP CALC LVOT AREA: 3 CM2
DOP CALC LVOT DIAMETER: 1.97 CM
DOP CALC LVOT PEAK VEL: 1.15 M/S
DOP CALC LVOT STROKE VOLUME: 61.54 CM3
DOP CALC MV VTI: 16.8 CM
DOP CALCLVOT PEAK VEL VTI: 20.2 CM
E WAVE DECELERATION TIME: 137.87 MSEC
E/A RATIO: 0.64
E/E' RATIO: 6 M/S
ECHO LV POSTERIOR WALL: 1.43 CM (ref 0.6–1.1)
EOSINOPHIL # BLD AUTO: 0.29 X10(3)/MCL (ref 0–0.9)
EOSINOPHIL NFR BLD AUTO: 1.3 %
ERYTHROCYTE [DISTWIDTH] IN BLOOD BY AUTOMATED COUNT: 17.3 % (ref 11.5–17)
EST. AVERAGE GLUCOSE BLD GHB EST-MCNC: 122.6 MG/DL
FRACTIONAL SHORTENING: 29 % (ref 28–44)
GFR SERPLBLD CREATININE-BSD FMLA CKD-EPI: >60 MLS/MIN/1.73/M2
GLOBULIN SER-MCNC: 5 GM/DL (ref 2.4–3.5)
GLUCOSE SERPL-MCNC: 103 MG/DL (ref 82–115)
GLUCOSE SERPL-MCNC: 63 MG/DL (ref 82–115)
GLUCOSE SERPL-MCNC: 97 MG/DL (ref 82–115)
HBA1C MFR BLD: 5.9 %
HCT VFR BLD AUTO: 25.9 % (ref 42–52)
HGB BLD-MCNC: 7.7 G/DL (ref 14–18)
HOLD SPECIMEN: NORMAL
HR MV ECHO: 99 BPM
IMM GRANULOCYTES # BLD AUTO: 0.14 X10(3)/MCL (ref 0–0.04)
IMM GRANULOCYTES NFR BLD AUTO: 0.6 %
INTERVENTRICULAR SEPTUM: 1.27 CM (ref 0.6–1.1)
IRON SATN MFR SERPL: 15 % (ref 20–50)
IRON SERPL-MCNC: 15 UG/DL (ref 65–175)
LEFT ATRIUM SIZE: 3.88 CM
LEFT INTERNAL DIMENSION IN SYSTOLE: 2.83 CM (ref 2.1–4)
LEFT VENTRICLE DIASTOLIC VOLUME INDEX: 35.26 ML/M2
LEFT VENTRICLE DIASTOLIC VOLUME: 69.46 ML
LEFT VENTRICLE MASS INDEX: 100 G/M2
LEFT VENTRICLE SYSTOLIC VOLUME INDEX: 15.4 ML/M2
LEFT VENTRICLE SYSTOLIC VOLUME: 30.37 ML
LEFT VENTRICULAR INTERNAL DIMENSION IN DIASTOLE: 3.99 CM (ref 3.5–6)
LEFT VENTRICULAR MASS: 196.87 G
LV LATERAL E/E' RATIO: 4.07 M/S
LV SEPTAL E/E' RATIO: 11.4 M/S
LVOT MG: 2.55 MMHG
LVOT MV: 0.73 CM/S
LYMPHOCYTES # BLD AUTO: 2.3 X10(3)/MCL (ref 0.6–4.6)
LYMPHOCYTES NFR BLD AUTO: 10.6 %
MAGNESIUM SERPL-MCNC: 1.8 MG/DL (ref 1.6–2.6)
MAGNESIUM SERPL-MCNC: 1.8 MG/DL (ref 1.6–2.6)
MAGNESIUM SERPL-MCNC: 2 MG/DL (ref 1.6–2.6)
MCH RBC QN AUTO: 22.6 PG (ref 27–31)
MCHC RBC AUTO-ENTMCNC: 29.7 G/DL (ref 33–36)
MCV RBC AUTO: 76.2 FL (ref 80–94)
MONOCYTES # BLD AUTO: 1.84 X10(3)/MCL (ref 0.1–1.3)
MONOCYTES NFR BLD AUTO: 8.5 %
MV MEAN GRADIENT: 2 MMHG
MV PEAK A VEL: 0.89 M/S
MV PEAK E VEL: 0.57 M/S
MV PEAK GRADIENT: 5 MMHG
MV STENOSIS PRESSURE HALF TIME: 39.98 MS
MV VALVE AREA BY CONTINUITY EQUATION: 3.66 CM2
MV VALVE AREA P 1/2 METHOD: 5.5 CM2
NEUTROPHILS # BLD AUTO: 16.99 X10(3)/MCL (ref 2.1–9.2)
NEUTROPHILS NFR BLD AUTO: 78.8 %
NRBC BLD AUTO-RTO: 0 %
OHS LV EJECTION FRACTION SIMPSONS BIPLANE MOD: 69 %
PHOSPHATE SERPL-MCNC: 2.2 MG/DL (ref 2.3–4.7)
PHOSPHATE SERPL-MCNC: 2.4 MG/DL (ref 2.3–4.7)
PHOSPHATE SERPL-MCNC: 3 MG/DL (ref 2.3–4.7)
PISA MRMAX VEL: 4.32 M/S
PISA TR MAX VEL: 2.79 M/S
PLATELET # BLD AUTO: 421 X10(3)/MCL (ref 130–400)
PMV BLD AUTO: 9.4 FL (ref 7.4–10.4)
POCT GLUCOSE: 100 MG/DL (ref 70–110)
POCT GLUCOSE: 105 MG/DL (ref 70–110)
POCT GLUCOSE: 78 MG/DL (ref 70–110)
POTASSIUM SERPL-SCNC: 3.2 MMOL/L (ref 3.5–5.1)
POTASSIUM SERPL-SCNC: 3.3 MMOL/L (ref 3.5–5.1)
POTASSIUM SERPL-SCNC: 3.3 MMOL/L (ref 3.5–5.1)
PREALB SERPL-MCNC: 6 MG/DL (ref 16–42)
PROT SERPL-MCNC: 6.6 GM/DL (ref 5.8–7.6)
RA MAJOR: 3 CM
RA PRESSURE ESTIMATED: 3 MMHG
RBC # BLD AUTO: 3.4 X10(6)/MCL (ref 4.7–6.1)
RV TB RVSP: 6 MMHG
SINUS: 3.8 CM
SODIUM SERPL-SCNC: 138 MMOL/L (ref 136–145)
SODIUM SERPL-SCNC: 140 MMOL/L (ref 136–145)
SODIUM SERPL-SCNC: 142 MMOL/L (ref 136–145)
TDI LATERAL: 0.14 M/S
TDI SEPTAL: 0.05 M/S
TDI: 0.1 M/S
TIBC SERPL-MCNC: 101 UG/DL (ref 250–450)
TIBC SERPL-MCNC: 86 UG/DL (ref 69–240)
TR MAX PG: 31 MMHG
TRANSFERRIN SERPL-MCNC: 78 MG/DL (ref 163–344)
TRICUSPID ANNULAR PLANE SYSTOLIC EXCURSION: 2.82 CM
TV REST PULMONARY ARTERY PRESSURE: 34 MMHG
WBC # SPEC AUTO: 21.6 X10(3)/MCL (ref 4.5–11.5)
Z-SCORE OF LEFT VENTRICULAR DIMENSION IN END DIASTOLE: -3.5
Z-SCORE OF LEFT VENTRICULAR DIMENSION IN END SYSTOLE: -1.63

## 2024-01-26 PROCEDURE — 25000003 PHARM REV CODE 250

## 2024-01-26 PROCEDURE — 83735 ASSAY OF MAGNESIUM: CPT

## 2024-01-26 PROCEDURE — 85025 COMPLETE CBC W/AUTO DIFF WBC: CPT

## 2024-01-26 PROCEDURE — 21400001 HC TELEMETRY ROOM

## 2024-01-26 PROCEDURE — 94761 N-INVAS EAR/PLS OXIMETRY MLT: CPT

## 2024-01-26 PROCEDURE — 86140 C-REACTIVE PROTEIN: CPT | Performed by: STUDENT IN AN ORGANIZED HEALTH CARE EDUCATION/TRAINING PROGRAM

## 2024-01-26 PROCEDURE — 80053 COMPREHEN METABOLIC PANEL: CPT

## 2024-01-26 PROCEDURE — 43762 RPLC GTUBE NO REVJ TRC: CPT | Mod: ,,, | Performed by: NURSE PRACTITIONER

## 2024-01-26 PROCEDURE — 93005 ELECTROCARDIOGRAM TRACING: CPT

## 2024-01-26 PROCEDURE — 63600175 PHARM REV CODE 636 W HCPCS

## 2024-01-26 PROCEDURE — 83036 HEMOGLOBIN GLYCOSYLATED A1C: CPT

## 2024-01-26 PROCEDURE — 83540 ASSAY OF IRON: CPT | Performed by: STUDENT IN AN ORGANIZED HEALTH CARE EDUCATION/TRAINING PROGRAM

## 2024-01-26 PROCEDURE — 11000001 HC ACUTE MED/SURG PRIVATE ROOM

## 2024-01-26 PROCEDURE — 25000003 PHARM REV CODE 250: Performed by: STUDENT IN AN ORGANIZED HEALTH CARE EDUCATION/TRAINING PROGRAM

## 2024-01-26 PROCEDURE — 0D20XUZ CHANGE FEEDING DEVICE IN UPPER INTESTINAL TRACT, EXTERNAL APPROACH: ICD-10-PCS | Performed by: NURSE PRACTITIONER

## 2024-01-26 PROCEDURE — 63600175 PHARM REV CODE 636 W HCPCS: Performed by: STUDENT IN AN ORGANIZED HEALTH CARE EDUCATION/TRAINING PROGRAM

## 2024-01-26 PROCEDURE — 84100 ASSAY OF PHOSPHORUS: CPT

## 2024-01-26 RX ORDER — METOPROLOL TARTRATE 25 MG/1
25 TABLET, FILM COATED ORAL 2 TIMES DAILY
Status: DISCONTINUED | OUTPATIENT
Start: 2024-01-26 | End: 2024-01-30 | Stop reason: HOSPADM

## 2024-01-26 RX ORDER — POTASSIUM CHLORIDE 20 MEQ/1
40 TABLET, EXTENDED RELEASE ORAL ONCE
Status: DISCONTINUED | OUTPATIENT
Start: 2024-01-26 | End: 2024-01-26

## 2024-01-26 RX ORDER — POTASSIUM CHLORIDE 7.45 MG/ML
10 INJECTION INTRAVENOUS
Status: COMPLETED | OUTPATIENT
Start: 2024-01-26 | End: 2024-01-27

## 2024-01-26 RX ORDER — NAPROXEN SODIUM 220 MG/1
81 TABLET, FILM COATED ORAL DAILY
Status: DISCONTINUED | OUTPATIENT
Start: 2024-01-27 | End: 2024-01-26

## 2024-01-26 RX ORDER — METOPROLOL TARTRATE 25 MG/1
25 TABLET, FILM COATED ORAL 2 TIMES DAILY
Status: DISCONTINUED | OUTPATIENT
Start: 2024-01-26 | End: 2024-01-26

## 2024-01-26 RX ORDER — GABAPENTIN 300 MG/1
600 CAPSULE ORAL 2 TIMES DAILY
Status: DISCONTINUED | OUTPATIENT
Start: 2024-01-26 | End: 2024-01-30 | Stop reason: HOSPADM

## 2024-01-26 RX ORDER — FLUCONAZOLE 2 MG/ML
200 INJECTION, SOLUTION INTRAVENOUS
Status: DISCONTINUED | OUTPATIENT
Start: 2024-01-26 | End: 2024-01-30 | Stop reason: HOSPADM

## 2024-01-26 RX ORDER — MAGNESIUM SULFATE 1 G/100ML
1 INJECTION INTRAVENOUS ONCE
Status: COMPLETED | OUTPATIENT
Start: 2024-01-26 | End: 2024-01-26

## 2024-01-26 RX ORDER — LISINOPRIL 2.5 MG/1
2.5 TABLET ORAL DAILY
Status: DISCONTINUED | OUTPATIENT
Start: 2024-01-27 | End: 2024-01-26

## 2024-01-26 RX ORDER — GABAPENTIN 300 MG/1
600 CAPSULE ORAL 2 TIMES DAILY
Status: DISCONTINUED | OUTPATIENT
Start: 2024-01-26 | End: 2024-01-26

## 2024-01-26 RX ORDER — MAGNESIUM SULFATE HEPTAHYDRATE 40 MG/ML
2 INJECTION, SOLUTION INTRAVENOUS ONCE
Status: COMPLETED | OUTPATIENT
Start: 2024-01-27 | End: 2024-01-27

## 2024-01-26 RX ORDER — NAPROXEN SODIUM 220 MG/1
81 TABLET, FILM COATED ORAL DAILY
Status: DISCONTINUED | OUTPATIENT
Start: 2024-01-27 | End: 2024-01-30 | Stop reason: HOSPADM

## 2024-01-26 RX ORDER — ATORVASTATIN CALCIUM 40 MG/1
40 TABLET, FILM COATED ORAL DAILY
Status: DISCONTINUED | OUTPATIENT
Start: 2024-01-27 | End: 2024-01-30 | Stop reason: HOSPADM

## 2024-01-26 RX ORDER — LISINOPRIL 2.5 MG/1
2.5 TABLET ORAL DAILY
Status: DISCONTINUED | OUTPATIENT
Start: 2024-01-27 | End: 2024-01-30 | Stop reason: HOSPADM

## 2024-01-26 RX ORDER — ATORVASTATIN CALCIUM 40 MG/1
40 TABLET, FILM COATED ORAL DAILY
Status: DISCONTINUED | OUTPATIENT
Start: 2024-01-27 | End: 2024-01-26

## 2024-01-26 RX ADMIN — POTASSIUM BICARBONATE 20 MEQ: 391 TABLET, EFFERVESCENT ORAL at 03:01

## 2024-01-26 RX ADMIN — POTASSIUM PHOSPHATE, MONOBASIC POTASSIUM PHOSPHATE, DIBASIC 20 MMOL: 224; 236 INJECTION, SOLUTION, CONCENTRATE INTRAVENOUS at 09:01

## 2024-01-26 RX ADMIN — POTASSIUM CHLORIDE 10 MEQ: 7.46 INJECTION, SOLUTION INTRAVENOUS at 09:01

## 2024-01-26 RX ADMIN — SODIUM CHLORIDE, POTASSIUM CHLORIDE, SODIUM LACTATE AND CALCIUM CHLORIDE: 600; 310; 30; 20 INJECTION, SOLUTION INTRAVENOUS at 03:01

## 2024-01-26 RX ADMIN — VANCOMYCIN HYDROCHLORIDE 1500 MG: 1.5 INJECTION, POWDER, LYOPHILIZED, FOR SOLUTION INTRAVENOUS at 04:01

## 2024-01-26 RX ADMIN — PIPERACILLIN SODIUM AND TAZOBACTAM SODIUM 4.5 G: 4; .5 INJECTION, POWDER, LYOPHILIZED, FOR SOLUTION INTRAVENOUS at 12:01

## 2024-01-26 RX ADMIN — FLUCONAZOLE 200 MG: 2 INJECTION, SOLUTION INTRAVENOUS at 11:01

## 2024-01-26 RX ADMIN — MUPIROCIN: 20 OINTMENT TOPICAL at 09:01

## 2024-01-26 RX ADMIN — POTASSIUM CHLORIDE 10 MEQ: 7.46 INJECTION, SOLUTION INTRAVENOUS at 11:01

## 2024-01-26 RX ADMIN — SODIUM CHLORIDE, POTASSIUM CHLORIDE, SODIUM LACTATE AND CALCIUM CHLORIDE: 600; 310; 30; 20 INJECTION, SOLUTION INTRAVENOUS at 12:01

## 2024-01-26 RX ADMIN — MUPIROCIN: 20 OINTMENT TOPICAL at 08:01

## 2024-01-26 RX ADMIN — MAGNESIUM SULFATE IN DEXTROSE 1 G: 10 INJECTION, SOLUTION INTRAVENOUS at 03:01

## 2024-01-26 RX ADMIN — GABAPENTIN 600 MG: 300 CAPSULE ORAL at 08:01

## 2024-01-26 RX ADMIN — PIPERACILLIN SODIUM AND TAZOBACTAM SODIUM 4.5 G: 4; .5 INJECTION, POWDER, LYOPHILIZED, FOR SOLUTION INTRAVENOUS at 09:01

## 2024-01-26 RX ADMIN — VANCOMYCIN HYDROCHLORIDE 1500 MG: 1.5 INJECTION, POWDER, LYOPHILIZED, FOR SOLUTION INTRAVENOUS at 05:01

## 2024-01-26 RX ADMIN — METOPROLOL TARTRATE 25 MG: 25 TABLET, FILM COATED ORAL at 08:01

## 2024-01-26 RX ADMIN — PIPERACILLIN SODIUM AND TAZOBACTAM SODIUM 4.5 G: 4; .5 INJECTION, POWDER, LYOPHILIZED, FOR SOLUTION INTRAVENOUS at 06:01

## 2024-01-26 NOTE — CONSULTS
Patient was seen at bedside to eval and treat multiple wounds present on admission. PT was transferred from an outside hospital for surgical services for sacral ulcer. Pt is very difficult to understand due to aphasia from CVA in 2020 so history is difficult to obtain. Pt was very difficult to turn to access sacral wound preventing updated photos and measurements. (See admit photo from ED below). Pt has a large chronically appearing stage 4 pressure injury with rolled edges. Open wound measured about 8x5x5 with significant undermining. While fibrinous tissue is noted throughout, no kevin necrosis noted warranting surgical intervention. Bone is felt to wound base that coincides with CT results showing bony erosion. Wound was cleaned and dressed. It will be difficult to prevent stool contamination. This will also make antibiotic management difficult. Unstagable pressure injury also noted to left ischium measuring about 3cm round.  Pt is also noted with a pressure injury to his right heal that was likely a stage 4 at some point but appears stable now. Lateral LLE with 5x3cm fibrinous wound. Left heel with intact dry blood blister vs SDTI. Will defer plan of care to IM team as wound care options are limited due to overall clinical picture. Palliative care may be appropriate. A specialty air bed is ordered. New orders placed and wound care to continue to follow.

## 2024-01-26 NOTE — PROGRESS NOTES
[facility] Medicine Wards progress note    Resident Team: [facility] Medicine List 3  Attending Physician: Ba Lee MD  Resident: Art Saunders  Intern: Chano Ely    Date of Admit: 1/25/2024    Chief Complaint     Skin Ulcer and urinary problems (Transferred from [place] due to sacral ulcer and uti. Yadav present upon arrival with cloudy urine and also sacral ulcer noted. O2 at 88% RA. O2 @ 2l/nc in place ems. )     Subjective:      History of Present Illness:  Fermin Garcia is a 64 y.o. male with a past medical history of CVA (2020), aphasia, hypertension, neurogenic who was transferred to our emergency department [facility] for surgical services.  Patient was sent to outside ED from SNF due to altered mental and hypotension.  On review of paperwork from outside facility, it appears that patient presented to the outside facility initially hypotensive at 88/55.  He was given 2 L NS with improvement in his blood pressure to 110/72.  Labs from the outside facility displayed a leukocytosis at 18 with left shift, microcytic anemia with a H/H of 9/29.6 (MCV 72), normal lactate of 1.9, elevated troponin 113.1 pg/mL.  UA indicative of UTI.  CT abdomen and pelvis significant for large decubitus ulcer overlying distal aspect of sacrum with evidence of bony erosion and extension of infectious process into the presacral space. On admission, patient awake and alert but aphasic and unable to provide any significant information about his condition.  Contacted patient's sister Kaila Trent ([phone]) stated the patient has been bed ridden in a nursing home stroke in 2020.  She states that sacral ulcer has been present for some time now and has been actively enlarging.  Internal medicine consulted for sepsis secondary to UTI/sacral wound    Interval history:    Patient resting comfortably this morning, wakes up and is alert but is still unable to answer questions at this time.  Nurse states patient did well  "overnight, had issues falling asleep but eventually did.  Put out approximately 900 mL of urine.  Noted to have dirty appearing PEG tube, has not started receiving feedings yet.  No episodes of fever overnight.  Patient denies any pain at this time.    Home Medications:  Prior to Admission medications    Medication Sig Start Date End Date Taking? Authorizing Provider   amitriptyline (ELAVIL) 25 MG tablet Take 25 mg by mouth every evening.    Provider, Historical   atorvastatin (LIPITOR) 40 MG tablet Take 40 mg by mouth once daily.    Provider, Historical   carvediloL (COREG) 12.5 MG tablet Take 12.5 mg by mouth 2 (two) times daily with meals.    Provider, Historical   EScitalopram oxalate (LEXAPRO) 10 MG tablet 1 tablet (10 mg total) by Per G Tube route nightly. 22  Marcelino Moreau MD   ezetimibe (ZETIA) 10 mg tablet Take 10 mg by mouth once daily.    Provider, Historical   gabapentin (NEURONTIN) 600 MG tablet Take 1 tablet (600 mg total) by mouth 2 (two) times daily. 22  Marcelino Moreau MD   insulin aspart U-100 (NOVOLOG) 100 unit/mL injection Inject 0-5 Units into the skin 3 (three) times daily with meals. 22  Marcelino Moreau MD   insulin detemir U-100 (LEVEMIR) 100 unit/mL injection Inject 17 Units into the skin once daily. 22  Marcelino Moreau MD   lisinopriL 10 MG tablet Take 10 mg by mouth once daily.    Provider, Historical     Review of Systems:  Review of Systems   Unable to perform ROS: Patient nonverbal     Objective:   Last 24 Hour Vital Signs:  BP  Min: 114/61  Max: 150/72  Temp  Av.2 °F (36.8 °C)  Min: 97.6 °F (36.4 °C)  Max: 98.6 °F (37 °C)  Pulse  Av.9  Min: 102  Max: 115  Resp  Av  Min: 18  Max: 24  SpO2  Av.4 %  Min: 92 %  Max: 100 %  Height  Av' 8" (172.7 cm)  Min: 5' 8" (172.7 cm)  Max: 5' 8" (172.7 cm)  Weight  Av.2 kg (185 lb 11.1 oz)  Min: 83.5 kg (184 lb)  Max: 85 kg (187 lb 6.3 oz)  Body mass index is " 27.98 kg/m².  I/O last 3 completed shifts:  In: -   Out: 600 [Urine:600]    Physical Examination:  Physical Exam  Vitals reviewed.   Constitutional:       General: He is not in acute distress.  Cardiovascular:      Rate and Rhythm: Regular rhythm. Tachycardia present.      Pulses: Normal pulses.      Heart sounds: No murmur heard.  Pulmonary:      Effort: Pulmonary effort is normal.      Breath sounds: Normal breath sounds. No wheezing.   Abdominal:      General: There is no distension.      Palpations: Abdomen is soft.      Comments: PEG tube place   Musculoskeletal:      Right lower leg: No edema.      Left lower leg: No edema.      Comments: There is a large sacral ulcer with visible bone w/ no evidence of necrotic tissue   There are several wounds of bilateral lower extremities, most significantly on left heel   Skin:     General: Skin is warm and dry.      Findings: Lesion present.   Neurological:      Mental Status: He is alert. Mental status is at baseline.      Comments: Patient difficult to understand due to aphasia  Patient is able to answer yes/no questions       Laboratory:  Most Recent Data:  CBC:   Lab Results   Component Value Date    WBC 21.60 (H) 01/26/2024    HGB 7.7 (L) 01/26/2024    HCT 25.9 (L) 01/26/2024     (H) 01/26/2024    MCV 76.2 (L) 01/26/2024    RDW 17.3 (H) 01/26/2024   WBC Differential:   Recent Labs   Lab 01/26/24  0300   WBC 21.60*   HGB 7.7*   HCT 25.9*   *   MCV 76.2*   BMP:   Lab Results   Component Value Date     01/26/2024    K 3.3 (L) 01/26/2024     11/01/2022    CO2 21 (L) 01/26/2024    BUN 15.9 01/26/2024    CREATININE 0.49 (L) 01/26/2024     (H) 11/01/2022    CALCIUM 8.5 (L) 01/26/2024    MG 2.00 01/26/2024    PHOS 2.2 (L) 01/26/2024     Radiology:  Imaging Results              X-Ray Chest AP Portable (Final result)  Result time 01/25/24 08:36:43      Final result by Aneesh Ahumada MD (01/25/24 08:36:43)                   Impression:       No acute cardiopulmonary process.      Electronically signed by: Aneesh Ahumada  Date:    01/25/2024  Time:    08:36               Narrative:    EXAMINATION:  XR CHEST AP PORTABLE    CLINICAL HISTORY:  Encounter for screening, unspecified    TECHNIQUE:  Single view of the chest    COMPARISON:  10/21/2022    FINDINGS:  No focal opacification, pleural effusion, or pneumothorax.    The cardiomediastinal silhouette is within normal limits.    No acute osseous abnormality.                                      Assessment & Plan:     1) Sepsis secondary to sacral ulcer vs.  urinary tract infection  - SIRS 3/4; qSOFA 2/3 on admit  - patient with a large sacral ulcer and multiple wounds to bilateral lower extremities  -CT abdomen pelvis from outside facility displayed ulcer with extension infectious processes to presacral space and bony erosion  - patient also noticed to have increased urine sediment in Yadav, UA showed findings consistent with UTI .  Urine culture still pending  -patient's blood pressure responded well to fluids  -cultures collected, patient received fluid bolus at 30 mL/kg, started on broad-spectrum antibiotics  -Diflucan added today for yeast seen in UA  - wound care consulted for sacral and other wounds    2)  Hx of MCA stroke      Aphasia   - according to records from nursing home, patient appears to be at baseline today  - CT head ordered on admission, negative for any acute pathology  - will continue home aspirin    3) HTN  - currently holding home antihypertensives as patient was hypotensive on arrival  - continue home lisinopril, metoprolol when appropriate    4) HLD  - continue home atorvastatin 40 mg daily    5) T2DM  -SSI  -will hold insulin until patient has feedings resumed    6) microcytic anemia   -hemoglobin 7.7, down from apparent baseline, MCV 76   -will add iron panel to further evaluate   -no active signs of bleeding, will look into previous scopes from past    7)  Hypokalemia  Hypophosphatemia   Concern for starvation ketoacidosis   -ketones 4+ on UA, unsure if patient has been receiving feeds as prescribed at nursing home  -repleting electrolytes this morning, will recheck with CMP in morning  -will monitor for refeeding syndrome      Cuong Corona DO

## 2024-01-26 NOTE — CONSULTS
Inpatient Nutrition Assessment    Admit Date: 1/25/2024   Total duration of encounter: 1 day   Patient Age: 64 y.o.    Nutrition Recommendation/Prescription     Initiate TF via peg: Impact Peptid 1.5 @ 20 ml/hr with goal rate of 60 ml/hr to provide 2070 kcal, 130 gm protein, 1060 ml free water based on 23 H run time: Flush with 150 ml water Q 4 H  Darius (provides 90 kcal, 2.5 g protein per serving) BID flush with 120 ml room temp water via peg  Monitor Weight Biweekly   Pleasure feeds of Puree as tolerated    Communication of Recommendations: reviewed with nurse    Nutrition Assessment     Malnutrition Assessment/Nutrition-Focused Physical Exam    Malnutrition Context: chronic illness (01/26/24 1030)  Malnutrition Level:  (unable to fully assess) (01/26/24 1030)  Energy Intake (Malnutrition):  (Unable to obtain, receiving pleasure feeds & TF PTA however peg is dirty indicating unused) (01/26/24 1030)  Weight Loss (Malnutrition):  (unable to obtain, no wt hx per EMR) (01/26/24 1030)                 [place] (Muscle Loss): severe depletion                                A minimum of two characteristics is recommended for diagnosis of either severe or non-severe malnutrition.    Chart Review    Reason Seen: continuous nutrition monitoring and physician consult for peg feeding    Malnutrition Screening Tool Results   Have you recently lost weight without trying?: No  Have you been eating poorly because of a decreased appetite?: No (has been tube fed at nursing home)   MST Score: 0   Diagnosis:  Sepsis d/t sacral ulcer vs UTI, Aphasia h/o Stroke, HTN, HLD, DM, Hypokalemia, Hypophosphatemia, Concern for starvation ketoacidosis    Relevant Medical History: CVA, Aphasia, HTN, Neurogenic    Scheduled Medications:  aspirin, 81 mg, Daily  atorvastatin, 40 mg, Daily  fluconazole (DIFLUCAN) IV (PEDS and ADULTS), 200 mg, Q24H  gabapentin, 600 mg, BID  lisinopriL, 2.5 mg, Daily  metoprolol tartrate, 25 mg, BID  mupirocin, ,  BID  piperacillin-tazobactam (Zosyn) IV (PEDS and ADULTS) (extended infusion is not appropriate), 4.5 g, Q8H  potassium phosphate IVPB, 20 mmol, Once  vancomycin (VANCOCIN) IV (PEDS and ADULTS), 1,500 mg, Q12H    Continuous Infusions:  lactated ringers, Last Rate: 100 mL/hr at 01/26/24 0004    PRN Medications: acetaminophen, dextrose 10%, dextrose 10%, glucagon (human recombinant), glucose, glucose, HYDROcodone-acetaminophen, insulin aspart U-100, melatonin, sodium chloride 0.9%    Calorie Containing IV Medications: no significant kcals from medications at this time    Recent Labs   Lab 01/25/24  1424 01/25/24  1429 01/26/24  0300   NA  --  142 142   K  --  3.9 3.3*   CALCIUM  --  8.7* 8.5*   PHOS  --   --  2.2*   MG  --  2.20 2.00   CHLORIDE  --  110* 111*   CO2  --  20* 21*   BUN  --  23.7 15.9   CREATININE  --  0.49* 0.49*   EGFRNORACEVR  --  >60 >60   GLUCOSE  --  88 63*   BILITOT  --  0.7 0.6   ALKPHOS  --  114 115   ALT  --  12 10   AST  --  20 18   ALBUMIN  --  1.8* 1.6*   CRP  --  269.90*  --    TRIG  --  48  --    HGBA1C 5.9  --  5.9   LIPASE  --  <4  --    WBC  --   --  21.60*   HGB  --   --  7.7*   HCT  --   --  25.9*     Nutrition Orders:  No diet orders on file      Appetite/Oral Intake: NPO/NPO  Factors Affecting Nutritional Intake: difficulty/impaired swallowing and NPO  Food/Orthodox/Cultural Preferences: unable to obtain  Food Allergies: no known food allergies  Last Bowel Movement: 01/23/24  Wound(s):  wounds to left LE & large sacral wound    Comments    1/26/24 -- Pt from NH, peg in place, GI consulted for functioning of peg d/t nsing reports unable to flush & dirty; GI NP able to flush peg but may need to be replaced -- TF recommendations provided to begin TF as medically able; Pt on Puree pleasure feeds & bolus TF at NH, ? If patient receiving TF as prescribed at NH; No recent weight noted per EMR, severe temple wasting noted indicating significant wt loss; No bed scale available to verify  "current weight; Large sacral wound reported -- suggest Darius BID via peg    Anthropometrics    Height: 5' 8" (172.7 cm),    Last Weight: 83.5 kg (184 lb) (01/25/24 1243), Weight Method: Bed Scale  BMI (Calculated): 28  BMI Classification: overweight (BMI 25-29.9)        Ideal Body Weight (IBW), Male: 154 lb     % Ideal Body Weight, Male (lb): 119.48 %                          Usual Weight Provided By: unable to obtain usual weight    Wt Readings from Last 5 Encounters:   01/25/24 83.5 kg (184 lb)   10/13/22 116.6 kg (257 lb)     Weight Change(s) Since Admission:   Wt Readings from Last 1 Encounters:   01/25/24 1243 83.5 kg (184 lb)   01/25/24 0730 85 kg (187 lb 6.3 oz)   Admit Weight: 85 kg (187 lb 6.3 oz) (01/25/24 0730), Weight Method: Bed Scale    Estimated Needs    Weight Used For Calorie Calculations: 84 kg (185 lb 3 oz)  Energy Calorie Requirements (kcal): 9241-2677 kcal (25-28 kcal/kg)  Energy Need Method: Kcal/kg  Weight Used For Protein Calculations: 84 kg (185 lb 3 oz)  Protein Requirements: 109-126 gm (1.3 - 1.5 gm/kg)  Fluid Requirements (mL): 2100 - 2350 ml (25 - 28 kcal/kg)    Enteral Nutrition     Patient not receiving enteral nutrition at this time.    Parenteral Nutrition     Patient not receiving parenteral nutrition support at this time.    Evaluation of Received Nutrient Intake    Calories: not meeting estimated needs  Protein: not meeting estimated needs    Patient Education     Not applicable.    Nutrition Diagnosis     PES: Inadequate oral intake related to chronic illness as evidenced by CVA, peg feedings. (new)       Nutrition Interventions     Intervention(s): modified composition of enteral nutrition, modified rate of enteral nutrition, commercial beverage, multivitamin/mineral supplement therapy, and collaboration with other providers    Goal: Meet greater than 80% of nutritional needs by follow-up. (new)  Goal: Tolerate enteral feeding at goal rate by follow-up. (new)    Nutrition Goals " & Monitoring     Dietitian will monitor: energy intake, enteral nutrition intake, weight change, electrolyte/renal panel, glucose/endocrine profile, and gastrointestinal profile    Nutrition Risk/Follow-Up: high (follow-up in 1-4 days)   Please consult if re-assessment needed sooner.

## 2024-01-26 NOTE — PROCEDURES
"Fermin Garcia is a 64 y.o. male patient.    Temp: 98.1 °F (36.7 °C) (01/26/24 1136)  Pulse: 98 (01/26/24 1136)  Resp: 20 (01/26/24 1136)  BP: 134/71 (01/26/24 1136)  SpO2: 100 % (01/26/24 1136)  Weight: 83.5 kg (184 lb) (01/25/24 1243)  Height: 5' 8" (172.7 cm) (01/25/24 1243)       Feeding Tube Replacement    Date/Time: 1/26/2024 12:38 PM  Location procedure was performed: [facility] GASTRO[facility]OLOGY    Performed by: Ermelinda Gutierrez FNP  Authorized by: Ermelinda Gutierrez FNP  Assisting provider: Farhan Rubio LPN  Pre-operative diagnosis: PEG tube malfunction  Post-operative diagnosis: PEG tube malfunction  Consent: Verbal consent obtained. Written consent not obtained.  Risks and benefits: risks, benefits and alternatives were discussed  Consent given by: Son, Fermin Thrasher, was contacted by phone along with Dr. Chano Ely to discussed procedure, risks, benefits. Verbal consent obtained and verified by both providers.  Patient understanding: patient states understanding of the procedure being performed  Patient consent: the patient's understanding of the procedure matches consent given  Procedure consent: procedure consent matches procedure scheduled  Patient identity confirmed: arm band  Time out: Immediately prior to procedure a "time out" was called to verify the correct patient, procedure, equipment, support staff and site/side marked as required.  Indications: tube malfunction    Sedation:  Patient sedated: no    Local anesthesia used: no    Anesthesia:  Local anesthesia used: no  Description of findings: 20 Fr G tube in place to left abdomen.   Tube type: gastrostomy  Patient position: supine  Procedure type: replacement  Tube size: 20 Fr.  Endoscope used: no  Bulb inflation volume: 10 (ml)  Bulb inflation fluid: normal saline  Placement/position confirmation: x-ray, auscultation and gastric contents aspirated  Tube placement difficulty: none  Technical procedures used: 20 Fr G tube with " internal bolster removed without difficulty.  20 Fr G tube lubricated and inserted into G tube site without difficulty.  Balloon inflated with 10 mL of normal saline.  Significant surgical tasks conducted by the assistant(s): assisting with supplies during procedure  Complications: No  Estimated blood loss (mL): less than 1 cc- minimal bleeding following removal of previous G tube. Bleeding was minimal and stopped quickly. No active bleeding observed at the completion of  procedure.  Specimens: No  Implants: No  Patient tolerance: patient tolerated the procedure well with no immediate complications            1/26/2024

## 2024-01-26 NOTE — CONSULTS
Gastroenterology/Hepatology Initial Consult Note    Patient Name: Fermin Garcia  Age: 64 y.o.  : 1960  MRN: [id number]  Admission Date: 2024    Reason for Consult:      Medical Management  Chief Complaint   Patient presents with    Skin Ulcer    urinary problems     Transferred from [facility] due to sacral ulcer and uti. Yadav present upon arrival with cloudy urine and also sacral ulcer noted. O2 at 88% RA. O2 @ 2l/nc in place ems.          Ketty Ridley    HPI:     Fermin Garcia is a 64 y.o. male history of CVA () with aphasia and hypertension who was sent to ED from SNF due to altered mental status and  hypotension.     GI service consulted for evaluation of PEG tube. He had a 20 Fr G tube with internal bolster placed on 10/27/2022 due to dysphagia from stroke.     Speech very difficult to understand related to previous CVA in  limiting ability to obtain history and ROS.     According to the staff at the [facility] located in [facility], staff were regularly using the PEG tube for both medications and bolus feedings. He was receiving Diabetisource bolus feeding of 240 ml x 3 during the day with one feeding at night. Staff denies any difficulty with the use of the PEG tube.      Spoke with son, Fermin Thrasher, over the phone who stated that approximately 5 weeks ago when the patient was hospitalized at [facility], the staff had difficulty using the PEG tube. He reports that when staff attempted to put anything into the PEG tube, it would come right back out of the tube.     No, Primary Doctor    Past Medical History:   Diagnosis Date    Hypertension     Stroke         History reviewed. No pertinent surgical history.     History reviewed. No pertinent family history.    Social History     Tobacco Use    Smoking status: Unknown    Smokeless tobacco: Not on file   Substance Use Topics    Alcohol use: Not Currently         Review of patient's allergies indicates:  No Known Allergies      Medications Prior to Admission   Medication Sig Dispense Refill Last Dose    amitriptyline (ELAVIL) 25 MG tablet Take 25 mg by mouth every evening.       atorvastatin (LIPITOR) 40 MG tablet Take 40 mg by mouth once daily.       carvediloL (COREG) 12.5 MG tablet Take 12.5 mg by mouth 2 (two) times daily with meals.       EScitalopram oxalate (LEXAPRO) 10 MG tablet 1 tablet (10 mg total) by Per G Tube route nightly. 30 tablet 11     ezetimibe (ZETIA) 10 mg tablet Take 10 mg by mouth once daily.       gabapentin (NEURONTIN) 600 MG tablet Take 1 tablet (600 mg total) by mouth 2 (two) times daily. 60 tablet 2     insulin aspart U-100 (NOVOLOG) 100 unit/mL injection Inject 0-5 Units into the skin 3 (three) times daily with meals. 4.5 mL 11     insulin detemir U-100 (LEVEMIR) 100 unit/mL injection Inject 17 Units into the skin once daily.       lisinopriL 10 MG tablet Take 10 mg by mouth once daily.            INPATIENT MEDICATIONS    Scheduled Meds:   aspirin  81 mg Oral Daily    atorvastatin  40 mg Oral Daily    fluconazole (DIFLUCAN) IV (PEDS and ADULTS)  200 mg Intravenous Q24H    gabapentin  600 mg Oral BID    lisinopriL  2.5 mg Oral Daily    metoprolol tartrate  25 mg Oral BID    mupirocin   Nasal BID    piperacillin-tazobactam (Zosyn) IV (PEDS and ADULTS) (extended infusion is not appropriate)  4.5 g Intravenous Q8H    potassium phosphate IVPB  20 mmol Intravenous Once    vancomycin (VANCOCIN) IV (PEDS and ADULTS)  1,500 mg Intravenous Q12H     Continuous Infusions:   lactated ringers 100 mL/hr at 01/26/24 0004     PRN Meds:  acetaminophen, dextrose 10%, dextrose 10%, glucagon (human recombinant), glucose, glucose, HYDROcodone-acetaminophen, insulin aspart U-100, melatonin, sodium chloride 0.9%          Review of Systems:       Review of Systems   Unable to perform ROS         Objective:       VITAL SIGNS: 24 HR MIN & MAX LAST    Temp  Min: 97.6 °F (36.4 °C)  Max: 98.6 °F (37 °C)  98.1 °F (36.7 °C)        BP   "Min: 114/61  Max: 150/72  134/71     Pulse  Min: 96  Max: 113  98     Resp  Min: 18  Max: 20  20    SpO2  Min: 92 %  Max: 100 %  100 %        Physical Exam  Vitals reviewed.   Constitutional:       General: He is awake.   Cardiovascular:      Rate and Rhythm: Normal rate.      Pulses: Normal pulses.   Pulmonary:      Effort: Pulmonary effort is normal.      Breath sounds: Normal breath sounds.   Abdominal:      General: Bowel sounds are normal. There is no distension.      Palpations: Abdomen is soft.      Tenderness: There is no abdominal tenderness.      Comments: 20 Fr PEG to left abdomen.   Skin:     Coloration: Skin is not jaundiced or pale.   Neurological:      Mental Status: He is alert. Mental status is at baseline.   Psychiatric:         Behavior: Behavior is cooperative.              LABS:      Recent Labs   Lab 01/26/24  0300   WBC 21.60*   HGB 7.7*   HCT 25.9*   *   MCV 76.2*       Recent Labs   Lab 01/26/24  0300   HGB 7.7*   HCT 25.9*        Recent Labs   Lab 01/25/24  1429 01/26/24  0300 01/26/24  1208    142 140   K 3.9 3.3* 3.3*   CO2 20* 21* 23   BUN 23.7 15.9 11.2   CREATININE 0.49* 0.49* 0.50*   BILITOT 0.7 0.6  --    ALKPHOS 114 115  --    AST 20 18  --    ALT 12 10  --    LABPROT 7.7* 6.6  --    ALBUMIN 1.8* 1.6*  --         No results for input(s): "INR", "PROTIME", "PTT" in the last 168 hours.     Recent Labs   Lab 01/26/24  0300   IRON 15*          IMAGING:   CT Head Without Contrast    Result Date: 1/25/2024  EXAMINATION: CT HEAD WITHOUT CONTRAST CLINICAL HISTORY: Mental status change, unknown cause; TECHNIQUE: Low dose axial CT images obtained throughout the head without intravenous contrast.  Axial, sagittal and coronal reconstructions were performed and interpreted. DLP: 2754 mGycm All CT scans at this location are performed using dose optimization techniques as appropriate to a performed exam including the following automated exposure control, adjustment of the mA and/or kV " according to patient size and/or use of iterative reconstruction technique COMPARISON: CT head 10/21/2022, MRI brain 10/12/2022 FINDINGS: BRAIN: There are expected, chronic sequela of previous left MCA territory infarct and wallerian degeneration.  Periventricular and subcortical white matter changes most compatible with chronic small vessel ischemic disease.  No hemorrhage. No edema. No significant mass effect or midline shift.  The posterior fossa and midline structures are unremarkable.  There are atherosclerotic calcifications. VENTRICLES: Ex vacuo dilatation of the ventricles. EXTRA-AXIAL: No abnormal extra-axial collections. BONES: Calvarium is intact. SINUSES AND MASTOIDS: Visualized paranasal sinuses and mastoid air cells are clear.     No appreciable acute intracranial abnormality. Chronic sequela of prior left MCA territory infarct and small vessel ischemic disease. Electronically signed by: Aniya Lopez Date:    01/25/2024 Time:    15:10    X-Ray Chest AP Portable    Result Date: 1/25/2024  EXAMINATION: XR CHEST AP PORTABLE CLINICAL HISTORY: Encounter for screening, unspecified TECHNIQUE: Single view of the chest COMPARISON: 10/21/2022 FINDINGS: No focal opacification, pleural effusion, or pneumothorax. The cardiomediastinal silhouette is within normal limits. No acute osseous abnormality.     No acute cardiopulmonary process. Electronically signed by: Aneesh Ahumada Date:    01/25/2024 Time:    08:36        Assessment / Plan:     Concern for PEG tube malfunction:  -Original PEG tube evaluated at bedside. Confirmed good placement through auscultation. No return when attempting to aspirate PEG. PEG tube easily flushed without difficulty. Due to reports from family member of recent difficulty with use of PEG tube in addition to concerns for poor nutritional status, PEG tube replacement should be considered.   -Discussed with son, Fermin Thrasher, who is in agreement with replacing the PEG tube. Also  discussed with patient who nods head indicating agreement with PEG tube replacement as well.   -20 Fr G tube replaced at bedside without difficulty after removal of previous G- tube. Please see procedure note.   -Abdominal x-ray ordered for placement confirmation.   -Once good placement of PEG tube has been confirmed, may use PEG for medications and feedings per nutrition recommendations.

## 2024-01-26 NOTE — CONSULTS
HISTORY & PHYSICAL  General Surgery    Patient Name: Fermin Garcia  YOB: 1960    Date: 01/26/2024                     PRESENTING HISTORY     Chief Complaint/Reason for Admission: sacral decubitus ulcer    History of Present Illness:  Mr. Fermin Garcia is a 64 y.o. male with pmh HTN, multiple CVAs who was transferred for surgical evaluation of sacral decubitus ulcer. Patient's speech is very difficult to understand so I obtained most of his history from chart review and from his son over the phone. Patient has been bed bound for the past year. He lives in a SNF. Sacral ulcer has worsened significantly over the past 6 months. There has been no purulent drainage. The ulcer extends to the sacral bone. The patient is G tube dependent and recently has been having issues with his G tube being clogged so unclear how much nutrition he has been receiving recently and how long that has been going on. Patient denies any pain at his sacrum. His only abdominal surgical history is his PEG tube. As far as his son knows he has never had a colonoscopy.    Review of Systems:  12 point ROS negative except as stated in HPI    PAST HISTORY:     Past Medical History:   Diagnosis Date    Hypertension     Stroke        History reviewed. No pertinent surgical history.    History reviewed. No pertinent family history.    Social History     Socioeconomic History    Marital status:    Tobacco Use    Smoking status: Unknown   Substance and Sexual Activity    Alcohol use: Not Currently    Drug use: Not Currently   Social History Narrative    ** Merged History Encounter **          Social Determinants of Health     Financial Resource Strain: Low Risk  (10/17/2022)    Overall Financial Resource Strain (CARDIA)     Difficulty of Paying Living Expenses: Not hard at all   Food Insecurity: No Food Insecurity (10/17/2022)    Hunger Vital Sign     Worried About Running Out of Food in the Last Year: Never true     Ran Out  of Food in the Last Year: Never true   Transportation Needs: No Transportation Needs (10/17/2022)    PRAPARE - Transportation     Lack of Transportation (Medical): No     Lack of Transportation (Non-Medical): No   Social Connections: Unknown (10/17/2022)    Social Connection and Isolation Panel [NHANES]     Frequency of Communication with Friends and Family: More than three times a week     Marital Status:    Housing Stability: Low Risk  (10/17/2022)    Housing Stability Vital Sign     Unable to Pay for Housing in the Last Year: No     Number of Places Lived in the Last Year: 1     Unstable Housing in the Last Year: No       MEDICATIONS & ALLERGIES:     No current facility-administered medications on file prior to encounter.     Current Outpatient Medications on File Prior to Encounter   Medication Sig    amitriptyline (ELAVIL) 25 MG tablet Take 25 mg by mouth every evening.    atorvastatin (LIPITOR) 40 MG tablet Take 40 mg by mouth once daily.    carvediloL (COREG) 12.5 MG tablet Take 12.5 mg by mouth 2 (two) times daily with meals.    EScitalopram oxalate (LEXAPRO) 10 MG tablet 1 tablet (10 mg total) by Per G Tube route nightly.    ezetimibe (ZETIA) 10 mg tablet Take 10 mg by mouth once daily.    gabapentin (NEURONTIN) 600 MG tablet Take 1 tablet (600 mg total) by mouth 2 (two) times daily.    insulin aspart U-100 (NOVOLOG) 100 unit/mL injection Inject 0-5 Units into the skin 3 (three) times daily with meals.    insulin detemir U-100 (LEVEMIR) 100 unit/mL injection Inject 17 Units into the skin once daily.    lisinopriL 10 MG tablet Take 10 mg by mouth once daily.       Review of patient's allergies indicates:  No Known Allergies    OBJECTIVE:     Vital Signs:  Temp:  [97.6 °F (36.4 °C)-98.6 °F (37 °C)] 98.1 °F (36.7 °C)  Pulse:  [] 98  Resp:  [18-20] 20  SpO2:  [92 %-100 %] 100 %  BP: (114-150)/(61-72) 134/71  Body mass index is 27.98 kg/m².     Physical Exam:  General:  no acute distress  HEENT:   Normocephalic, atraumatic, PERRL, EOMI, clear sclera  CVS:  RRR  Resp:  normal work of breathing on room air  GI:  Abdomen soft, non-tender, non-distended, G tube in place, no surgical scars seen  :  Deferred  MSK:  No muscle atrophy, cyanosis, peripheral edema, full range of motion  Skin:  stage 4 sacral decubitus ulcer without purulence, eschar, or necrosis  Psych:  alert, difficult to understand his speech    Laboratory  Troponin:  Recent Labs     01/25/24  1424   TROPONINI 0.044     CBC:  Recent Labs     01/26/24  0300   WBC 21.60*   RBC 3.40*   HGB 7.7*   HCT 25.9*   *   MCV 76.2*   MCH 22.6*   MCHC 29.7*     CMP:  Recent Labs     01/25/24  1429 01/26/24  0300 01/26/24  1208   CALCIUM 8.7* 8.5* 8.3*   ALBUMIN 1.8* 1.6*  --     142 140   K 3.9 3.3* 3.3*   CO2 20* 21* 23   BUN 23.7 15.9 11.2   CREATININE 0.49* 0.49* 0.50*   ALKPHOS 114 115  --    ALT 12 10  --    AST 20 18  --    BILITOT 0.7 0.6  --            ASSESSMENT & PLAN:   Mr. Fermin Garcia is a 64 y.o. male transferred for evaluation of chronically worsening stage 4 sacral decubitus ulcer. No evidence of infection or necrosis at this time which would require debridement.     Plan:  - no urgent surgery indicated  - An elective diverting colostomy could be considered to aid wound healing    - prior to considering proceeding with elective surgery the patient requires the following:  evaluation by cardiology for risk stratification  2. nutritional optimization. Prealbumin and CRP ordered  3. needs to complete treatment of fungal UTI  4.  colonoscopy in the setting of unexplained anemia and reportedly no prior colonoscopy    Patient's son will also discuss with him if a colostomy would align with his goals of care    This procedure would be elective and does not need to be done during this admission. Please call surgery back when the above is complete or refer to clinic upon discharge for outpatient follow up.        Sariah Stewart,  MD  [employer] General Surgery PGY6

## 2024-01-26 NOTE — MEDICAL/APP STUDENT
[facility]   Consult Note  General Surgery    Patient Name: Fermin Garcia  YOB: 1960  Date of Admission: 1/25/2024  Date: 01/26/2024 12:26 PM  Reason for Consult: ***  HD#1  POD#* No surgery found *    PRESENTING HISTORY     Chief Complaint/Reason for Admission: <principal problem not specified>    History of Present Illness:  ***    Review of Systems:  12 point ROS negative except as stated in HPI    PAST HISTORY:   Past medical history:  Past Medical History:   Diagnosis Date    Hypertension     Stroke        Past surgical history:  History reviewed. No pertinent surgical history.    Family history:  History reviewed. No pertinent family history.    Social history:  Social History     Socioeconomic History    Marital status:    Tobacco Use    Smoking status: Unknown   Substance and Sexual Activity    Alcohol use: Not Currently    Drug use: Not Currently   Social History Narrative    ** Merged History Encounter **          Social Determinants of Health     Financial Resource Strain: Low Risk  (10/17/2022)    Overall Financial Resource Strain (CARDIA)     Difficulty of Paying Living Expenses: Not hard at all   Food Insecurity: No Food Insecurity (10/17/2022)    Hunger Vital Sign     Worried About Running Out of Food in the Last Year: Never true     Ran Out of Food in the Last Year: Never true   Transportation Needs: No Transportation Needs (10/17/2022)    PRAPARE - Transportation     Lack of Transportation (Medical): No     Lack of Transportation (Non-Medical): No   Social Connections: Unknown (10/17/2022)    Social Connection and Isolation Panel [NHANES]     Frequency of Communication with Friends and Family: More than three times a week     Marital Status:    Housing Stability: Low Risk  (10/17/2022)    Housing Stability Vital Sign     Unable to Pay for Housing in the Last Year: No     Number of Places Lived in the Last Year: 1     Unstable Housing in the Last Year:  No     Social History     Tobacco Use   Smoking Status Unknown   Smokeless Tobacco Not on file         MEDICATIONS & ALLERGIES:   Allergies: Review of patient's allergies indicates:  No Known Allergies    No current facility-administered medications on file prior to encounter.     Current Outpatient Medications on File Prior to Encounter   Medication Sig    amitriptyline (ELAVIL) 25 MG tablet Take 25 mg by mouth every evening.    atorvastatin (LIPITOR) 40 MG tablet Take 40 mg by mouth once daily.    carvediloL (COREG) 12.5 MG tablet Take 12.5 mg by mouth 2 (two) times daily with meals.    EScitalopram oxalate (LEXAPRO) 10 MG tablet 1 tablet (10 mg total) by Per G Tube route nightly.    ezetimibe (ZETIA) 10 mg tablet Take 10 mg by mouth once daily.    gabapentin (NEURONTIN) 600 MG tablet Take 1 tablet (600 mg total) by mouth 2 (two) times daily.    insulin aspart U-100 (NOVOLOG) 100 unit/mL injection Inject 0-5 Units into the skin 3 (three) times daily with meals.    insulin detemir U-100 (LEVEMIR) 100 unit/mL injection Inject 17 Units into the skin once daily.    lisinopriL 10 MG tablet Take 10 mg by mouth once daily.       Scheduled Meds:   aspirin  81 mg Oral Daily    atorvastatin  40 mg Oral Daily    fluconazole (DIFLUCAN) IV (PEDS and ADULTS)  200 mg Intravenous Q24H    gabapentin  600 mg Oral BID    lisinopriL  2.5 mg Oral Daily    metoprolol tartrate  25 mg Oral BID    mupirocin   Nasal BID    piperacillin-tazobactam (Zosyn) IV (PEDS and ADULTS) (extended infusion is not appropriate)  4.5 g Intravenous Q8H    potassium phosphate IVPB  20 mmol Intravenous Once    vancomycin (VANCOCIN) IV (PEDS and ADULTS)  1,500 mg Intravenous Q12H       Continuous Infusions:   lactated ringers 100 mL/hr at 01/26/24 0004       PRN Meds:acetaminophen, dextrose 10%, dextrose 10%, glucagon (human recombinant), glucose, glucose, HYDROcodone-acetaminophen, insulin aspart U-100, melatonin, sodium chloride 0.9%    OBJECTIVE:     Vital  "Signs:  Temp:  [97.6 °F (36.4 °C)-98.6 °F (37 °C)] 98.1 °F (36.7 °C)  Pulse:  [] 98  Resp:  [18-20] 20  SpO2:  [92 %-100 %] 100 %  BP: (114-150)/(61-72) 134/71  Body mass index is 27.98 kg/m².     I/O last 3 completed shifts:  In: -   Out: 1500 [Urine:1500]  No intake/output data recorded.    Physical Exam:  General:  Well developed, well nourished, no acute distress***  HEENT:  Normocephalic, atraumatic, PERRL, EOMI, clear sclera, ears normal, neck supple, throat clear without erythema or exudates***  CVS:  RRR***  Resp:  Normal work of breathing on RA***, equal rise and fall of the chest***  GI: Abdomen soft, non-tender, non-distended, no masses, no guarding, no rebound***  :  Deferred***  MSK:  No muscle atrophy, cyanosis, peripheral edema, moving all extremities spontaneously***  Vascular: *2+ radial pulses bilaterally*. All extremities WWP***  Skin:  No rashes, ulcers, erythema***  Neuro:  CNII-XII grossly intact, alert and oriented to person, place, and time***    Laboratory:  Recent Labs     01/26/24  0300   WBC 21.60*   HGB 7.7*   HCT 25.9*   *     Recent Labs     01/25/24  1429 01/26/24  0300    142   K 3.9 3.3*   CO2 20* 21*   BUN 23.7 15.9   CREATININE 0.49* 0.49*   CALCIUM 8.7* 8.5*   MG 2.20 2.00   PHOS  --  2.2*   ALBUMIN 1.8* 1.6*   BILITOT 0.7 0.6   AST 20 18   ALKPHOS 114 115   ALT 12 10     Troponin:  Recent Labs     01/25/24  1424   TROPONINI 0.044     CBC:  Recent Labs     01/26/24  0300   WBC 21.60*   RBC 3.40*   HGB 7.7*   HCT 25.9*   *   MCV 76.2*   MCH 22.6*   MCHC 29.7*     CMP:  Recent Labs     01/25/24  1429 01/26/24  0300   CALCIUM 8.7* 8.5*   ALBUMIN 1.8* 1.6*    142   K 3.9 3.3*   CO2 20* 21*   BUN 23.7 15.9   CREATININE 0.49* 0.49*   ALKPHOS 114 115   ALT 12 10   AST 20 18   BILITOT 0.7 0.6     Lactic Acid:  No results for input(s): "LACTATE" in the last 72 hours.  Etoh:  No results for input(s): "ALCOHOLMEDIC" in the last 72 hours.  Drug Screen:  No " "results for input(s): "PCDSCOMETHA", "COCAINEMETAB", "OPIATESCREEN", "BARBITURATES", "AMPHETAMINES", "MARIJUANATHC", "PCDSOPHENCYN", "CREATRANDUR", "TOXINFO" in the last 72 hours.    ABG:  No results for input(s): "PH", "PCO2", "PO2", "HCO3", "BE", "POCSATURATED" in the last 72 hours.    Diagnostic Results:  CT Head Without Contrast    Result Date: 1/25/2024  No appreciable acute intracranial abnormality. Chronic sequela of prior left MCA territory infarct and small vessel ischemic disease. Electronically signed by: Aniya Lopez Date:    01/25/2024 Time:    15:10    CT Head Without Contrast   Final Result      No appreciable acute intracranial abnormality.      Chronic sequela of prior left MCA territory infarct and small vessel ischemic disease.         Electronically signed by: Aniya Lopez   Date:    01/25/2024   Time:    15:10      X-Ray Chest AP Portable   Final Result      No acute cardiopulmonary process.         Electronically signed by: Aneesh Ahumada   Date:    01/25/2024   Time:    08:36      CT Previous   Final Result          Microbiology:  Microbiology Results (last 7 days)       Procedure Component Value Units Date/Time    Urine culture [[id number]] Collected: 01/25/24 1615    Order Status: Completed Specimen: Urine Updated: 01/26/24 0622     Urine Culture No Growth At 24 Hours    Blood Culture #2 **CANNOT BE ORDERED STAT** [[id number]] Collected: 01/25/24 1421    Order Status: Resulted Specimen: Blood from Hand, Left Updated: 01/25/24 1427    Blood Culture #1 **CANNOT BE ORDERED STAT** [[id number]] Collected: 01/25/24 1346    Order Status: Resulted Specimen: Blood from Hand, Right Updated: 01/25/24 1427    Blood Culture #2 **CANNOT BE ORDERED STAT** [[id number]]     Order Status: Canceled Specimen: Blood, Venous     Blood Culture #1 **CANNOT BE ORDERED STAT** [[id number]]     Order Status: Canceled Specimen: Blood, Venous              ASSESSMENT & PLAN:   ***    Plan:    ***  1/26/2024  12:26 " PM

## 2024-01-27 LAB
ALBUMIN SERPL-MCNC: 1.4 G/DL (ref 3.4–4.8)
ALBUMIN/GLOB SERPL: 0.3 RATIO (ref 1.1–2)
ALP SERPL-CCNC: 82 UNIT/L (ref 40–150)
ALT SERPL-CCNC: 8 UNIT/L (ref 0–55)
AST SERPL-CCNC: 15 UNIT/L (ref 5–34)
BASOPHILS # BLD AUTO: 0.02 X10(3)/MCL
BASOPHILS NFR BLD AUTO: 0.1 %
BILIRUB SERPL-MCNC: 0.5 MG/DL
BUN SERPL-MCNC: 7.1 MG/DL (ref 8.4–25.7)
CALCIUM SERPL-MCNC: 7.8 MG/DL (ref 8.8–10)
CHLORIDE SERPL-SCNC: 106 MMOL/L (ref 98–107)
CO2 SERPL-SCNC: 24 MMOL/L (ref 23–31)
CREAT SERPL-MCNC: 0.39 MG/DL (ref 0.73–1.18)
EOSINOPHIL # BLD AUTO: 0.42 X10(3)/MCL (ref 0–0.9)
EOSINOPHIL NFR BLD AUTO: 2.8 %
ERYTHROCYTE [DISTWIDTH] IN BLOOD BY AUTOMATED COUNT: 17.3 % (ref 11.5–17)
GFR SERPLBLD CREATININE-BSD FMLA CKD-EPI: >60 MLS/MIN/1.73/M2
GLOBULIN SER-MCNC: 4.4 GM/DL (ref 2.4–3.5)
GLUCOSE SERPL-MCNC: 104 MG/DL (ref 82–115)
HCT VFR BLD AUTO: 25.5 % (ref 42–52)
HGB BLD-MCNC: 7.7 G/DL (ref 14–18)
HOLD SPECIMEN: NORMAL
IMM GRANULOCYTES # BLD AUTO: 0.06 X10(3)/MCL (ref 0–0.04)
IMM GRANULOCYTES NFR BLD AUTO: 0.4 %
LYMPHOCYTES # BLD AUTO: 2.17 X10(3)/MCL (ref 0.6–4.6)
LYMPHOCYTES NFR BLD AUTO: 14.5 %
MAGNESIUM SERPL-MCNC: 1.7 MG/DL (ref 1.6–2.6)
MCH RBC QN AUTO: 22.6 PG (ref 27–31)
MCHC RBC AUTO-ENTMCNC: 30.2 G/DL (ref 33–36)
MCV RBC AUTO: 74.8 FL (ref 80–94)
MONOCYTES # BLD AUTO: 1.61 X10(3)/MCL (ref 0.1–1.3)
MONOCYTES NFR BLD AUTO: 10.7 %
NEUTROPHILS # BLD AUTO: 10.71 X10(3)/MCL (ref 2.1–9.2)
NEUTROPHILS NFR BLD AUTO: 71.5 %
NRBC BLD AUTO-RTO: 0 %
PHOSPHATE SERPL-MCNC: 2.2 MG/DL (ref 2.3–4.7)
PLATELET # BLD AUTO: 368 X10(3)/MCL (ref 130–400)
PMV BLD AUTO: 8.9 FL (ref 7.4–10.4)
POCT GLUCOSE: 139 MG/DL (ref 70–110)
POCT GLUCOSE: 140 MG/DL (ref 70–110)
POCT GLUCOSE: 140 MG/DL (ref 70–110)
POCT GLUCOSE: 149 MG/DL (ref 70–110)
POTASSIUM SERPL-SCNC: 3.5 MMOL/L (ref 3.5–5.1)
PROT SERPL-MCNC: 5.8 GM/DL (ref 5.8–7.6)
RBC # BLD AUTO: 3.41 X10(6)/MCL (ref 4.7–6.1)
SODIUM SERPL-SCNC: 136 MMOL/L (ref 136–145)
VANCOMYCIN TROUGH SERPL-MCNC: 13 UG/ML (ref 15–20)
WBC # SPEC AUTO: 14.99 X10(3)/MCL (ref 4.5–11.5)

## 2024-01-27 PROCEDURE — 63600175 PHARM REV CODE 636 W HCPCS: Performed by: STUDENT IN AN ORGANIZED HEALTH CARE EDUCATION/TRAINING PROGRAM

## 2024-01-27 PROCEDURE — 25000003 PHARM REV CODE 250: Performed by: STUDENT IN AN ORGANIZED HEALTH CARE EDUCATION/TRAINING PROGRAM

## 2024-01-27 PROCEDURE — 83735 ASSAY OF MAGNESIUM: CPT

## 2024-01-27 PROCEDURE — 25000003 PHARM REV CODE 250

## 2024-01-27 PROCEDURE — 84100 ASSAY OF PHOSPHORUS: CPT

## 2024-01-27 PROCEDURE — 27000221 HC OXYGEN, UP TO 24 HOURS

## 2024-01-27 PROCEDURE — 80053 COMPREHEN METABOLIC PANEL: CPT

## 2024-01-27 PROCEDURE — 80202 ASSAY OF VANCOMYCIN: CPT

## 2024-01-27 PROCEDURE — 85025 COMPLETE CBC W/AUTO DIFF WBC: CPT

## 2024-01-27 PROCEDURE — 93005 ELECTROCARDIOGRAM TRACING: CPT

## 2024-01-27 PROCEDURE — 94761 N-INVAS EAR/PLS OXIMETRY MLT: CPT

## 2024-01-27 PROCEDURE — 21400001 HC TELEMETRY ROOM

## 2024-01-27 PROCEDURE — 63600175 PHARM REV CODE 636 W HCPCS

## 2024-01-27 RX ADMIN — GABAPENTIN 600 MG: 300 CAPSULE ORAL at 08:01

## 2024-01-27 RX ADMIN — VANCOMYCIN HYDROCHLORIDE 1750 MG: 500 INJECTION, POWDER, LYOPHILIZED, FOR SOLUTION INTRAVENOUS at 08:01

## 2024-01-27 RX ADMIN — SODIUM CHLORIDE, POTASSIUM CHLORIDE, SODIUM LACTATE AND CALCIUM CHLORIDE: 600; 310; 30; 20 INJECTION, SOLUTION INTRAVENOUS at 07:01

## 2024-01-27 RX ADMIN — MAGNESIUM SULFATE HEPTAHYDRATE 2 G: 40 INJECTION, SOLUTION INTRAVENOUS at 05:01

## 2024-01-27 RX ADMIN — MUPIROCIN: 20 OINTMENT TOPICAL at 08:01

## 2024-01-27 RX ADMIN — METOPROLOL TARTRATE 25 MG: 25 TABLET, FILM COATED ORAL at 08:01

## 2024-01-27 RX ADMIN — FLUCONAZOLE 200 MG: 2 INJECTION, SOLUTION INTRAVENOUS at 12:01

## 2024-01-27 RX ADMIN — PIPERACILLIN SODIUM AND TAZOBACTAM SODIUM 4.5 G: 4; .5 INJECTION, POWDER, LYOPHILIZED, FOR SOLUTION INTRAVENOUS at 03:01

## 2024-01-27 RX ADMIN — LISINOPRIL 2.5 MG: 2.5 TABLET ORAL at 08:01

## 2024-01-27 RX ADMIN — SODIUM CHLORIDE, POTASSIUM CHLORIDE, SODIUM LACTATE AND CALCIUM CHLORIDE: 600; 310; 30; 20 INJECTION, SOLUTION INTRAVENOUS at 08:01

## 2024-01-27 RX ADMIN — VANCOMYCIN HYDROCHLORIDE 1500 MG: 1.5 INJECTION, POWDER, LYOPHILIZED, FOR SOLUTION INTRAVENOUS at 06:01

## 2024-01-27 RX ADMIN — ASPIRIN 81 MG CHEWABLE TABLET 81 MG: 81 TABLET CHEWABLE at 08:01

## 2024-01-27 RX ADMIN — PIPERACILLIN SODIUM AND TAZOBACTAM SODIUM 4.5 G: 4; .5 INJECTION, POWDER, LYOPHILIZED, FOR SOLUTION INTRAVENOUS at 07:01

## 2024-01-27 RX ADMIN — POTASSIUM PHOSPHATE, MONOBASIC POTASSIUM PHOSPHATE, DIBASIC 15 MMOL: 224; 236 INJECTION, SOLUTION, CONCENTRATE INTRAVENOUS at 08:01

## 2024-01-27 RX ADMIN — ATORVASTATIN CALCIUM 40 MG: 40 TABLET, FILM COATED ORAL at 08:01

## 2024-01-27 RX ADMIN — PIPERACILLIN SODIUM AND TAZOBACTAM SODIUM 4.5 G: 4; .5 INJECTION, POWDER, LYOPHILIZED, FOR SOLUTION INTRAVENOUS at 12:01

## 2024-01-27 RX ADMIN — POTASSIUM CHLORIDE 10 MEQ: 7.46 INJECTION, SOLUTION INTRAVENOUS at 03:01

## 2024-01-27 RX ADMIN — POTASSIUM CHLORIDE 10 MEQ: 7.46 INJECTION, SOLUTION INTRAVENOUS at 01:01

## 2024-01-27 NOTE — PROGRESS NOTES
Pharmacokinetic Assessment Follow Up: IV Vancomycin    Vancomycin serum concentration assessment(s):    The random level was drawn correctly and can be used to guide therapy at this time. The measurement is below the desired definitive target range of 15 to 20 mcg/mL.    Vancomycin Regimen Plan:    Change regimen to Vancomycin 1750 mg IV every 12 hours with next serum trough concentration measured at 1700 prior to 1800 dose on 1/28    Drug levels (last 3 results):  Recent Labs   Lab Result Units 01/27/24  0413   Vancomycin Trough ug/ml 13.0*       Pharmacy will continue to follow and monitor vancomycin.    Please contact pharmacy at extension [phone] for questions regarding this assessment.    Thank you for the consult,   Jen Rosas       Patient brief summary:  Fermin Garcia is a 64 y.o. male initiated on antimicrobial therapy with IV Vancomycin for treatment of bacteremia    The patient's current regimen is vancomycin 1750 mg q 12 h     Drug Allergies:   Review of patient's allergies indicates:  No Known Allergies    Actual Body Weight:   83.5 kg    Renal Function:   Estimated Creatinine Clearance: 196.1 mL/min (A) (based on SCr of 0.39 mg/dL (L)).,     Dialysis Method (if applicable):  N/A    CBC (last 72 hours):  Recent Labs   Lab Result Units 01/25/24  1424 01/26/24  0300 01/27/24  0414   WBC x10(3)/mcL  --  21.60* 14.99*   Hgb g/dL  --  7.7* 7.7*   Hemoglobin A1c % 5.9 5.9  --    Hct %  --  25.9* 25.5*   Platelet x10(3)/mcL  --  421* 368   Mono % %  --  8.5 10.7   Eos % %  --  1.3 2.8   Basophil % %  --  0.2 0.1       Metabolic Panel (last 72 hours):  Recent Labs   Lab Result Units 01/25/24  1429 01/25/24  1615 01/26/24  0300 01/26/24  1208 01/26/24 2026 01/27/24  0413   Sodium Level mmol/L 142  --  142 140 138 136   Potassium Level mmol/L 3.9  --  3.3* 3.3* 3.2* 3.5   Chloride mmol/L 110*  --  111* 108* 105 106   Carbon Dioxide mmol/L 20*  --  21* 23 25 24   Glucose Level mg/dL 88  --  63* 103 97 104    Glucose, UA   --  4+*  --   --   --   --    Blood Urea Nitrogen mg/dL 23.7  --  15.9 11.2 8.1* 7.1*   Creatinine mg/dL 0.49*  --  0.49* 0.50* 0.44* 0.39*   Albumin Level g/dL 1.8*  --  1.6*  --   --  1.4*   Bilirubin Total mg/dL 0.7  --  0.6  --   --  0.5   Alkaline Phosphatase unit/L 114  --  115  --   --  82   Aspartate Aminotransferase unit/L 20  --  18  --   --  15   Alanine Aminotransferase unit/L 12  --  10  --   --  8   Magnesium Level mg/dL 2.20  --  2.00 1.80 1.80 1.70   Phosphorus Level mg/dL  --   --  2.2* 3.0 2.4 2.2*       Vancomycin Administrations:  vancomycin given in the last 96 hours                     vancomycin 1,500 mg in dextrose 5 % (D5W) 250 mL IVPB (Vial-Mate) (mg) 1,500 mg New Bag 01/27/24 0615     1,500 mg New Bag 01/26/24 1720     1,500 mg New Bag  0431    vancomycin (VANCOCIN) 2,000 mg in dextrose 5 % (D5W) 500 mL IVPB (mg) 2,000 mg New Bag 01/25/24 1629                    Microbiologic Results:  Microbiology Results (last 7 days)       Procedure Component Value Units Date/Time    Blood Culture #1 **CANNOT BE ORDERED STAT** [[id number]]  (Normal) Collected: 01/25/24 1346    Order Status: Completed Specimen: Blood from Hand, Right Updated: 01/26/24 1900     CULTURE, BLOOD ([facility]) No Growth At 24 Hours    Blood Culture #2 **CANNOT BE ORDERED STAT** [[id number]]  (Normal) Collected: 01/25/24 1421    Order Status: Completed Specimen: Blood from Hand, Left Updated: 01/26/24 1900     CULTURE, BLOOD ([facility]) No Growth At 24 Hours    Urine culture [[id number]] Collected: 01/25/24 1615    Order Status: Completed Specimen: Urine Updated: 01/26/24 0622     Urine Culture No Growth At 24 Hours    Blood Culture #2 **CANNOT BE ORDERED STAT** [[id number]]     Order Status: Canceled Specimen: Blood, Venous     Blood Culture #1 **CANNOT BE ORDERED STAT** [[id number]]     Order Status: Canceled Specimen: Blood, Venous

## 2024-01-27 NOTE — NURSING
Nursing home where patient is a resident [facility], faxed over patient chart. Patient sacral wound cultured on 01/23/2024 results came back positive for E. Coli on today. Team notified, spoke with Dr. Zaman

## 2024-01-27 NOTE — PROGRESS NOTES
[facility] Medicine Wards progress note    Resident Team: [facility] Medicine List 3  Attending Physician: aB Lee MD  Resident: Art Saunders  Intern: Chano Ely    Date of Admit: 1/25/2024    Chief Complaint     Skin Ulcer and urinary problems (Transferred from [place] due to sacral ulcer and uti. Yadav present upon arrival with cloudy urine and also sacral ulcer noted. O2 at 88% RA. O2 @ 2l/nc in place ems. )     Subjective:      History of Present Illness:  Fermin Garcia is a 64 y.o. male with a past medical history of CVA (2020), aphasia, hypertension, neurogenic who was transferred to our emergency department [facility] for surgical services.  Patient was sent to outside ED from SNF due to altered mental and hypotension.  On review of paperwork from outside facility, it appears that patient presented to the outside facility initially hypotensive at 88/55.  He was given 2 L NS with improvement in his blood pressure to 110/72.  Labs from the outside facility displayed a leukocytosis at 18 with left shift, microcytic anemia with a H/H of 9/29.6 (MCV 72), normal lactate of 1.9, elevated troponin 113.1 pg/mL.  UA indicative of UTI.  CT abdomen and pelvis significant for large decubitus ulcer overlying distal aspect of sacrum with evidence of bony erosion and extension of infectious process into the presacral space. On admission, patient awake and alert but aphasic and unable to provide any significant information about his condition.  Contacted patient's sister Kaila Trent ([phone]) stated the patient has been bed ridden in a nursing home stroke in 2020.  She states that sacral ulcer has been present for some time now and has been actively enlarging.  Internal medicine consulted for sepsis secondary to UTI/sacral wound    Interval history:    No acute events overnight.  Patient afebrile with improved leukocytosis to 15 this morning.  Patient was continued on vancomycin and Zosyn.  Blood cultures  "negative at 24 hours, urine culture negative at 24 hours.  Wound culture reported from nursing home positive for E coli.  Peg tube changed yesterday due to reported difficulties with feeding by patient's son and evidence of starvation ketosis on labs.  Patient tolerated feeding well yesterday afternoon.  We will continue to follow electrolytes due to concern for refeeding syndrome and correct as needed.    Home Medications:  Prior to Admission medications    Medication Sig Start Date End Date Taking? Authorizing Provider   amitriptyline (ELAVIL) 25 MG tablet Take 25 mg by mouth every evening.    Provider, Historical   atorvastatin (LIPITOR) 40 MG tablet Take 40 mg by mouth once daily.    Provider, Historical   carvediloL (COREG) 12.5 MG tablet Take 12.5 mg by mouth 2 (two) times daily with meals.    Provider, Historical   EScitalopram oxalate (LEXAPRO) 10 MG tablet 1 tablet (10 mg total) by Per G Tube route nightly. 22  Marcelino Moreau MD   ezetimibe (ZETIA) 10 mg tablet Take 10 mg by mouth once daily.    Provider, Historical   gabapentin (NEURONTIN) 600 MG tablet Take 1 tablet (600 mg total) by mouth 2 (two) times daily. 22  Marcelino Moreau MD   insulin aspart U-100 (NOVOLOG) 100 unit/mL injection Inject 0-5 Units into the skin 3 (three) times daily with meals. 22  Marcelino Moreau MD   insulin detemir U-100 (LEVEMIR) 100 unit/mL injection Inject 17 Units into the skin once daily. 22  Marcelino Moreau MD   lisinopriL 10 MG tablet Take 10 mg by mouth once daily.    Provider, Historical     Review of Systems:  Review of Systems   Unable to perform ROS: Patient nonverbal     Objective:   Last 24 Hour Vital Signs:  BP  Min: 112/67  Max: 134/71  Temp  Av.3 °F (36.8 °C)  Min: 97.9 °F (36.6 °C)  Max: 98.6 °F (37 °C)  Pulse  Av.1  Min: 86  Max: 100  Resp  Av.3  Min: 18  Max: 20  SpO2  Av %  Min: 100 %  Max: 100 %  Height  Av' 8" (172.7 cm)  " "Min: 5' 8" (172.7 cm)  Max: 5' 8" (172.7 cm)  Weight  Av.5 kg (184 lb)  Min: 83.5 kg (184 lb)  Max: 83.5 kg (184 lb)  Body mass index is 27.98 kg/m².  I/O last 3 completed shifts:  In: 4004.6 [I.V.:2101.8; NG/GT:710; IV Piggyback:1192.8]  Out: 2200 [Urine:2200]    Physical Examination:  Physical Exam  Vitals reviewed.   Constitutional:       General: He is not in acute distress.  Cardiovascular:      Rate and Rhythm: Regular rhythm. Tachycardia present.      Pulses: Normal pulses.      Heart sounds: No murmur heard.  Pulmonary:      Effort: Pulmonary effort is normal.      Breath sounds: Normal breath sounds. No wheezing.   Abdominal:      General: There is no distension.      Palpations: Abdomen is soft.      Comments: PEG tube place, changed yesterday   Musculoskeletal:      Right lower leg: No edema.      Left lower leg: No edema.      Comments: There is a large sacral ulcer with visible bone w/ no evidence of necrotic tissue   There are several wounds of bilateral lower extremities, most significantly on left heel   Skin:     General: Skin is warm and dry.      Findings: Lesion present.   Neurological:      Mental Status: He is alert. Mental status is at baseline.      Comments: Patient difficult to understand due to aphasia  Patient is able to answer yes/no questions       Laboratory:  Most Recent Data:  CBC:   Lab Results   Component Value Date    WBC 14.99 (H) 2024    HGB 7.7 (L) 2024    HCT 25.5 (L) 2024     2024    MCV 74.8 (L) 2024    RDW 17.3 (H) 2024   WBC Differential:   Recent Labs   Lab 24  0300 24  0414   WBC 21.60* 14.99*   HGB 7.7* 7.7*   HCT 25.9* 25.5*   * 368   MCV 76.2* 74.8*   BMP:   Lab Results   Component Value Date     2024    K 3.5 2024     2022    CO2 24 2024    BUN 7.1 (L) 2024    CREATININE 0.39 (L) 2024     (H) 2022    CALCIUM 7.8 (L) 2024    MG 1.70 " 01/27/2024    PHOS 2.2 (L) 01/27/2024     Radiology:  Imaging Results              X-Ray Chest AP Portable (Final result)  Result time 01/25/24 08:36:43      Final result by Aneesh Ahumada MD (01/25/24 08:36:43)                   Impression:      No acute cardiopulmonary process.      Electronically signed by: Aneesh Ahumada  Date:    01/25/2024  Time:    08:36               Narrative:    EXAMINATION:  XR CHEST AP PORTABLE    CLINICAL HISTORY:  Encounter for screening, unspecified    TECHNIQUE:  Single view of the chest    COMPARISON:  10/21/2022    FINDINGS:  No focal opacification, pleural effusion, or pneumothorax.    The cardiomediastinal silhouette is within normal limits.    No acute osseous abnormality.                                      Assessment & Plan:     1) Sepsis secondary to sacral ulcer vs.  urinary tract infection  - SIRS 3/4; qSOFA 2/3 on admit  - patient with a large sacral ulcer and multiple wounds to bilateral lower extremities  -CT abdomen pelvis from outside facility displayed ulcer with extension infectious processes to presacral space and bony erosion  - patient also noticed to have increased urine sediment in Yadav, UA showed findings consistent with UTI .  Urine culture negative at 24 hours  -patient's blood pressure responded well to fluids on admit  -cultures collected and are so far negative at 24 hours, patient received fluid bolus at 30 mL/kg, started on broad-spectrum antibiotics.  Vancomycin and Zosyn (D3)  -Diflucan added for yeast seen in UA (D2)  - wound care consulted for sacral and other wounds  -surgery evaluated patient and did not believe patient warranted any surgical intervention at this time    2)  Hx of MCA stroke      Aphasia   - according to records from nursing home, patient appears to be at baseline today  - CT head ordered on admission, negative for any acute pathology  - will continue home aspirin    3) HTN  - continue home lisinopril, metoprolol   - last BP  112/67, will follow. Withhold medications if needed.    4) HLD  - continue home atorvastatin 40 mg daily    5) T2DM  -SSI    6) microcytic anemia   -hemoglobin 7.7, down from apparent baseline, MCV 76   -Iron panel suggestive of ACD  -no active signs of bleeding, will look into previous scopes from past    7) Hypokalemia  Hypophosphatemia   Concern for starvation ketoacidosis   -ketones 4+ on UA, unsure if patient has been receiving feeds as prescribed at nursing home  -PEG changed by GI team yesterday  -patient tolerated feeds yesterday  -Recheck electrolytes at noon, replete as needed    Chano Ely, DO  [facility], PGY-1

## 2024-01-28 LAB
ALBUMIN SERPL-MCNC: 1.5 G/DL (ref 3.4–4.8)
ALBUMIN/GLOB SERPL: 0.3 RATIO (ref 1.1–2)
ALP SERPL-CCNC: 101 UNIT/L (ref 40–150)
ALT SERPL-CCNC: 10 UNIT/L (ref 0–55)
ANION GAP SERPL CALC-SCNC: 9 MEQ/L
AST SERPL-CCNC: 15 UNIT/L (ref 5–34)
BACTERIA UR CULT: ABNORMAL
BACTERIA UR CULT: ABNORMAL
BASOPHILS # BLD AUTO: 0.02 X10(3)/MCL
BASOPHILS NFR BLD AUTO: 0.1 %
BILIRUB SERPL-MCNC: 0.4 MG/DL
BUN SERPL-MCNC: 8.2 MG/DL (ref 8.4–25.7)
BUN SERPL-MCNC: 8.3 MG/DL (ref 8.4–25.7)
CALCIUM SERPL-MCNC: 8 MG/DL (ref 8.8–10)
CALCIUM SERPL-MCNC: 8 MG/DL (ref 8.8–10)
CHLORIDE SERPL-SCNC: 102 MMOL/L (ref 98–107)
CHLORIDE SERPL-SCNC: 104 MMOL/L (ref 98–107)
CO2 SERPL-SCNC: 24 MMOL/L (ref 23–31)
CO2 SERPL-SCNC: 25 MMOL/L (ref 23–31)
CREAT SERPL-MCNC: 0.42 MG/DL (ref 0.73–1.18)
CREAT SERPL-MCNC: 0.43 MG/DL (ref 0.73–1.18)
CREAT/UREA NIT SERPL: 20
EOSINOPHIL # BLD AUTO: 0.39 X10(3)/MCL (ref 0–0.9)
EOSINOPHIL NFR BLD AUTO: 2.7 %
ERYTHROCYTE [DISTWIDTH] IN BLOOD BY AUTOMATED COUNT: 17 % (ref 11.5–17)
GFR SERPLBLD CREATININE-BSD FMLA CKD-EPI: >60 MLS/MIN/1.73/M2
GFR SERPLBLD CREATININE-BSD FMLA CKD-EPI: >60 MLS/MIN/1.73/M2
GLOBULIN SER-MCNC: 4.8 GM/DL (ref 2.4–3.5)
GLUCOSE SERPL-MCNC: 133 MG/DL (ref 82–115)
GLUCOSE SERPL-MCNC: 158 MG/DL (ref 82–115)
HCT VFR BLD AUTO: 26.5 % (ref 42–52)
HGB BLD-MCNC: 8 G/DL (ref 14–18)
IMM GRANULOCYTES # BLD AUTO: 0.09 X10(3)/MCL (ref 0–0.04)
IMM GRANULOCYTES NFR BLD AUTO: 0.6 %
LYMPHOCYTES # BLD AUTO: 1.97 X10(3)/MCL (ref 0.6–4.6)
LYMPHOCYTES NFR BLD AUTO: 13.9 %
MAGNESIUM SERPL-MCNC: 1.8 MG/DL (ref 1.6–2.6)
MAGNESIUM SERPL-MCNC: 1.8 MG/DL (ref 1.6–2.6)
MCH RBC QN AUTO: 22.3 PG (ref 27–31)
MCHC RBC AUTO-ENTMCNC: 30.2 G/DL (ref 33–36)
MCV RBC AUTO: 74 FL (ref 80–94)
MONOCYTES # BLD AUTO: 1.52 X10(3)/MCL (ref 0.1–1.3)
MONOCYTES NFR BLD AUTO: 10.7 %
NEUTROPHILS # BLD AUTO: 10.2 X10(3)/MCL (ref 2.1–9.2)
NEUTROPHILS NFR BLD AUTO: 72 %
NRBC BLD AUTO-RTO: 0 %
PHOSPHATE SERPL-MCNC: 2.4 MG/DL (ref 2.3–4.7)
PHOSPHATE SERPL-MCNC: 2.5 MG/DL (ref 2.3–4.7)
PLATELET # BLD AUTO: 415 X10(3)/MCL (ref 130–400)
PMV BLD AUTO: 9.2 FL (ref 7.4–10.4)
POCT GLUCOSE: 155 MG/DL (ref 70–110)
POCT GLUCOSE: 158 MG/DL (ref 70–110)
POCT GLUCOSE: 175 MG/DL (ref 70–110)
POTASSIUM SERPL-SCNC: 3.6 MMOL/L (ref 3.5–5.1)
POTASSIUM SERPL-SCNC: 3.7 MMOL/L (ref 3.5–5.1)
PREALB SERPL-MCNC: 7 MG/DL (ref 16–42)
PROT SERPL-MCNC: 6.3 GM/DL (ref 5.8–7.6)
RBC # BLD AUTO: 3.58 X10(6)/MCL (ref 4.7–6.1)
SODIUM SERPL-SCNC: 135 MMOL/L (ref 136–145)
SODIUM SERPL-SCNC: 136 MMOL/L (ref 136–145)
VANCOMYCIN TROUGH SERPL-MCNC: 21.8 UG/ML (ref 15–20)
WBC # SPEC AUTO: 14.19 X10(3)/MCL (ref 4.5–11.5)

## 2024-01-28 PROCEDURE — 80053 COMPREHEN METABOLIC PANEL: CPT

## 2024-01-28 PROCEDURE — 80048 BASIC METABOLIC PNL TOTAL CA: CPT

## 2024-01-28 PROCEDURE — 63600175 PHARM REV CODE 636 W HCPCS: Performed by: STUDENT IN AN ORGANIZED HEALTH CARE EDUCATION/TRAINING PROGRAM

## 2024-01-28 PROCEDURE — 25000003 PHARM REV CODE 250: Performed by: STUDENT IN AN ORGANIZED HEALTH CARE EDUCATION/TRAINING PROGRAM

## 2024-01-28 PROCEDURE — 25000003 PHARM REV CODE 250

## 2024-01-28 PROCEDURE — 84134 ASSAY OF PREALBUMIN: CPT | Performed by: STUDENT IN AN ORGANIZED HEALTH CARE EDUCATION/TRAINING PROGRAM

## 2024-01-28 PROCEDURE — 85025 COMPLETE CBC W/AUTO DIFF WBC: CPT

## 2024-01-28 PROCEDURE — 94761 N-INVAS EAR/PLS OXIMETRY MLT: CPT

## 2024-01-28 PROCEDURE — 80202 ASSAY OF VANCOMYCIN: CPT

## 2024-01-28 PROCEDURE — 21400001 HC TELEMETRY ROOM

## 2024-01-28 PROCEDURE — 84100 ASSAY OF PHOSPHORUS: CPT

## 2024-01-28 PROCEDURE — 83735 ASSAY OF MAGNESIUM: CPT

## 2024-01-28 PROCEDURE — 63600175 PHARM REV CODE 636 W HCPCS

## 2024-01-28 RX ADMIN — FLUCONAZOLE 200 MG: 2 INJECTION, SOLUTION INTRAVENOUS at 11:01

## 2024-01-28 RX ADMIN — PIPERACILLIN SODIUM AND TAZOBACTAM SODIUM 4.5 G: 4; .5 INJECTION, POWDER, LYOPHILIZED, FOR SOLUTION INTRAVENOUS at 07:01

## 2024-01-28 RX ADMIN — METOPROLOL TARTRATE 25 MG: 25 TABLET, FILM COATED ORAL at 08:01

## 2024-01-28 RX ADMIN — MUPIROCIN: 20 OINTMENT TOPICAL at 08:01

## 2024-01-28 RX ADMIN — SODIUM CHLORIDE, POTASSIUM CHLORIDE, SODIUM LACTATE AND CALCIUM CHLORIDE: 600; 310; 30; 20 INJECTION, SOLUTION INTRAVENOUS at 10:01

## 2024-01-28 RX ADMIN — GABAPENTIN 600 MG: 300 CAPSULE ORAL at 08:01

## 2024-01-28 RX ADMIN — VANCOMYCIN HYDROCHLORIDE 1750 MG: 500 INJECTION, POWDER, LYOPHILIZED, FOR SOLUTION INTRAVENOUS at 09:01

## 2024-01-28 RX ADMIN — LISINOPRIL 2.5 MG: 2.5 TABLET ORAL at 08:01

## 2024-01-28 RX ADMIN — ASPIRIN 81 MG CHEWABLE TABLET 81 MG: 81 TABLET CHEWABLE at 08:01

## 2024-01-28 RX ADMIN — PIPERACILLIN SODIUM AND TAZOBACTAM SODIUM 4.5 G: 4; .5 INJECTION, POWDER, LYOPHILIZED, FOR SOLUTION INTRAVENOUS at 03:01

## 2024-01-28 RX ADMIN — VANCOMYCIN HYDROCHLORIDE 1750 MG: 500 INJECTION, POWDER, LYOPHILIZED, FOR SOLUTION INTRAVENOUS at 07:01

## 2024-01-28 RX ADMIN — ATORVASTATIN CALCIUM 40 MG: 40 TABLET, FILM COATED ORAL at 08:01

## 2024-01-28 RX ADMIN — SODIUM CHLORIDE 125 MG: 9 INJECTION, SOLUTION INTRAVENOUS at 10:01

## 2024-01-28 RX ADMIN — PIPERACILLIN SODIUM AND TAZOBACTAM SODIUM 4.5 G: 4; .5 INJECTION, POWDER, LYOPHILIZED, FOR SOLUTION INTRAVENOUS at 11:01

## 2024-01-28 NOTE — PROGRESS NOTES
[facility] Medicine Wards progress note    Resident Team: [facility] Medicine List 3  Attending Physician: Ba Lee MD  Resident: Art Saunders  Intern: Chano Ely    Date of Admit: 1/25/2024    Chief Complaint     Skin Ulcer and urinary problems (Transferred from [place] due to sacral ulcer and uti. Yadav present upon arrival with cloudy urine and also sacral ulcer noted. O2 at 88% RA. O2 @ 2l/nc in place ems. )     Subjective:      History of Present Illness:  Fermin Garcia is a 64 y.o. male with a past medical history of CVA (2020), aphasia, hypertension, neurogenic who was transferred to our emergency department [facility] for surgical services.  Patient was sent to outside ED from SNF due to altered mental and hypotension.  On review of paperwork from outside facility, it appears that patient presented to the outside facility initially hypotensive at 88/55.  He was given 2 L NS with improvement in his blood pressure to 110/72.  Labs from the outside facility displayed a leukocytosis at 18 with left shift, microcytic anemia with a H/H of 9/29.6 (MCV 72), normal lactate of 1.9, elevated troponin 113.1 pg/mL.  UA indicative of UTI.  CT abdomen and pelvis significant for large decubitus ulcer overlying distal aspect of sacrum with evidence of bony erosion and extension of infectious process into the presacral space. On admission, patient awake and alert but aphasic and unable to provide any significant information about his condition.  Contacted patient's sister Kaila Trent ([phone]) stated the patient has been bed ridden in a nursing home stroke in 2020.  She states that sacral ulcer has been present for some time now and has been actively enlarging.  Internal medicine consulted for sepsis secondary to UTI/sacral wound    Interval history:    No acute events overnight.  Patient afebrile and receiving tube feeds.  Patient is aphasic however he is able to tell me that he has no acute complaints at  this time    Home Medications:  Prior to Admission medications    Medication Sig Start Date End Date Taking? Authorizing Provider   amitriptyline (ELAVIL) 25 MG tablet Take 25 mg by mouth every evening.    Provider, Historical   atorvastatin (LIPITOR) 40 MG tablet Take 40 mg by mouth once daily.    Provider, Historical   carvediloL (COREG) 12.5 MG tablet Take 12.5 mg by mouth 2 (two) times daily with meals.    Provider, Historical   EScitalopram oxalate (LEXAPRO) 10 MG tablet 1 tablet (10 mg total) by Per G Tube route nightly. 22  Marcelino Moreau MD   ezetimibe (ZETIA) 10 mg tablet Take 10 mg by mouth once daily.    Provider, Historical   gabapentin (NEURONTIN) 600 MG tablet Take 1 tablet (600 mg total) by mouth 2 (two) times daily. 22  Marcelino Moreau MD   insulin aspart U-100 (NOVOLOG) 100 unit/mL injection Inject 0-5 Units into the skin 3 (three) times daily with meals. 22  Marcelino Moreau MD   insulin detemir U-100 (LEVEMIR) 100 unit/mL injection Inject 17 Units into the skin once daily. 22  Marcelino Moreau MD   lisinopriL 10 MG tablet Take 10 mg by mouth once daily.    Provider, Historical     Review of Systems:  Review of Systems   Unable to perform ROS: Patient nonverbal     Objective:   Last 24 Hour Vital Signs:  BP  Min: 126/68  Max: 157/79  Temp  Av.3 °F (36.8 °C)  Min: 97.5 °F (36.4 °C)  Max: 98.6 °F (37 °C)  Pulse  Av.1  Min: 85  Max: 107  Resp  Av  Min: 18  Max: 18  SpO2  Av.3 %  Min: 98 %  Max: 100 %  Body mass index is 27.98 kg/m².  I/O last 3 completed shifts:  In: 5337.6 [I.V.:1152.1; NG/GT:2960; IV Piggyback:1225.5]  Out: 6700 [Urine:6700]    Physical Examination:  Physical Exam  Vitals reviewed.   Constitutional:       General: He is not in acute distress.  Cardiovascular:      Rate and Rhythm: Regular rhythm. Tachycardia present.      Pulses: Normal pulses.      Heart sounds: No murmur heard.  Pulmonary:      Effort:  Pulmonary effort is normal.      Breath sounds: Normal breath sounds. No wheezing.   Abdominal:      General: There is no distension.      Palpations: Abdomen is soft.      Comments: PEG tube place, changed yesterday   Musculoskeletal:      Right lower leg: No edema.      Left lower leg: No edema.      Comments: There is a large sacral ulcer with visible bone w/ no evidence of necrotic tissue   There are several wounds of bilateral lower extremities, most significantly on left heel   Skin:     General: Skin is warm and dry.      Findings: Lesion present.   Neurological:      Mental Status: He is alert. Mental status is at baseline.      Comments: Patient difficult to understand due to aphasia  Patient is able to answer yes/no questions       Laboratory:  Most Recent Data:  CBC:   Lab Results   Component Value Date    WBC 14.19 (H) 01/28/2024    HGB 8.0 (L) 01/28/2024    HCT 26.5 (L) 01/28/2024     (H) 01/28/2024    MCV 74.0 (L) 01/28/2024    RDW 17.0 01/28/2024   WBC Differential:   Recent Labs   Lab 01/26/24  0300 01/27/24  0414 01/28/24  0337   WBC 21.60* 14.99* 14.19*   HGB 7.7* 7.7* 8.0*   HCT 25.9* 25.5* 26.5*   * 368 415*   MCV 76.2* 74.8* 74.0*     BMP:   Lab Results   Component Value Date     01/28/2024    K 3.6 01/28/2024     11/01/2022    CO2 25 01/28/2024    BUN 8.3 (L) 01/28/2024    CREATININE 0.43 (L) 01/28/2024     (H) 11/01/2022    CALCIUM 8.0 (L) 01/28/2024    MG 1.80 01/28/2024    PHOS 2.4 01/28/2024     Radiology:  Imaging Results              X-Ray Chest AP Portable (Final result)  Result time 01/25/24 08:36:43      Final result by Aneesh Ahumada MD (01/25/24 08:36:43)                   Impression:      No acute cardiopulmonary process.      Electronically signed by: Aneesh Ahumada  Date:    01/25/2024  Time:    08:36               Narrative:    EXAMINATION:  XR CHEST AP PORTABLE    CLINICAL HISTORY:  Encounter for screening, unspecified    TECHNIQUE:  Single  view of the chest    COMPARISON:  10/21/2022    FINDINGS:  No focal opacification, pleural effusion, or pneumothorax.    The cardiomediastinal silhouette is within normal limits.    No acute osseous abnormality.                                      Assessment & Plan:     1) Sepsis secondary to sacral ulcer vs.  urinary tract infection  - SIRS 3/4; qSOFA 2/3 on admit  - patient with a large sacral ulcer and multiple wounds to bilateral lower extremities  -CT abdomen pelvis from outside facility displayed ulcer with extension infectious processes to presacral space and bony erosion  -cultures collected and are so far negative at 24 hours, patient received fluid bolus at 30 mL/kg, started on broad-spectrum antibiotics.  Vancomycin and Zosyn (D4)  -Diflucan added for yeast seen in UA (D3)  - wound care consulted for sacral and other wounds  -surgery evaluated patient and did not believe patient warranted any surgical intervention at this time  -pre-albumin has increased however still markedly decreased concern for inability to heal.  Will need GOC discussions as patient remains full code. May need mri and prolonged abx     2)  Hx of MCA stroke      Aphasia   - according to records from nursing home, patient appears to be at baseline today  - CT head ordered on admission, negative for any acute pathology  - will continue home aspirin    3) HTN  - continue home lisinopril, metoprolol   - last /67, will follow. Withhold medications if needed.    4) HLD  - continue home atorvastatin 40 mg daily    5) T2DM  -SSI    6) microcytic anemia   -hemoglobin 7.7, down from apparent baseline, MCV 76   -Iron panel suggestive of ACD, decreased fe saturation will give iron  -no active signs of bleeding, will look into previous scopes from past      MD DEBRA HAMILTON PGY-III  [facility] Med Surg Telemetry  01/28/2024   9:35 AM

## 2024-01-29 LAB
ALBUMIN SERPL-MCNC: 1.6 G/DL (ref 3.4–4.8)
ALBUMIN/GLOB SERPL: 0.3 RATIO (ref 1.1–2)
ALP SERPL-CCNC: 87 UNIT/L (ref 40–150)
ALT SERPL-CCNC: 10 UNIT/L (ref 0–55)
AST SERPL-CCNC: 15 UNIT/L (ref 5–34)
BASOPHILS # BLD AUTO: 0.03 X10(3)/MCL
BASOPHILS NFR BLD AUTO: 0.2 %
BILIRUB SERPL-MCNC: 0.3 MG/DL
BUN SERPL-MCNC: 8.6 MG/DL (ref 8.4–25.7)
CALCIUM SERPL-MCNC: 8.2 MG/DL (ref 8.8–10)
CHLORIDE SERPL-SCNC: 106 MMOL/L (ref 98–107)
CO2 SERPL-SCNC: 25 MMOL/L (ref 23–31)
CREAT SERPL-MCNC: 0.41 MG/DL (ref 0.73–1.18)
EOSINOPHIL # BLD AUTO: 0.27 X10(3)/MCL (ref 0–0.9)
EOSINOPHIL NFR BLD AUTO: 2 %
ERYTHROCYTE [DISTWIDTH] IN BLOOD BY AUTOMATED COUNT: 17.1 % (ref 11.5–17)
FERRITIN SERPL-MCNC: 662.87 NG/ML (ref 21.81–274.66)
FOLATE SERPL-MCNC: 10.4 NG/ML (ref 7–31.4)
GFR SERPLBLD CREATININE-BSD FMLA CKD-EPI: >60 MLS/MIN/1.73/M2
GLOBULIN SER-MCNC: 4.8 GM/DL (ref 2.4–3.5)
GLUCOSE SERPL-MCNC: 146 MG/DL (ref 82–115)
HCT VFR BLD AUTO: 26.1 % (ref 42–52)
HGB BLD-MCNC: 7.8 G/DL (ref 14–18)
HOLD SPECIMEN: NORMAL
IMM GRANULOCYTES # BLD AUTO: 0.1 X10(3)/MCL (ref 0–0.04)
IMM GRANULOCYTES NFR BLD AUTO: 0.8 %
LYMPHOCYTES # BLD AUTO: 1.97 X10(3)/MCL (ref 0.6–4.6)
LYMPHOCYTES NFR BLD AUTO: 14.9 %
MAGNESIUM SERPL-MCNC: 1.8 MG/DL (ref 1.6–2.6)
MCH RBC QN AUTO: 22.1 PG (ref 27–31)
MCHC RBC AUTO-ENTMCNC: 29.9 G/DL (ref 33–36)
MCV RBC AUTO: 73.9 FL (ref 80–94)
MONOCYTES # BLD AUTO: 1.39 X10(3)/MCL (ref 0.1–1.3)
MONOCYTES NFR BLD AUTO: 10.5 %
NEUTROPHILS # BLD AUTO: 9.47 X10(3)/MCL (ref 2.1–9.2)
NEUTROPHILS NFR BLD AUTO: 71.6 %
NRBC BLD AUTO-RTO: 0 %
PHOSPHATE SERPL-MCNC: 2.3 MG/DL (ref 2.3–4.7)
PLATELET # BLD AUTO: 416 X10(3)/MCL (ref 130–400)
PMV BLD AUTO: 9 FL (ref 7.4–10.4)
POCT GLUCOSE: 138 MG/DL (ref 70–110)
POCT GLUCOSE: 153 MG/DL (ref 70–110)
POCT GLUCOSE: 163 MG/DL (ref 70–110)
POCT GLUCOSE: 204 MG/DL (ref 70–110)
POTASSIUM SERPL-SCNC: 3.5 MMOL/L (ref 3.5–5.1)
PROT SERPL-MCNC: 6.4 GM/DL (ref 5.8–7.6)
RBC # BLD AUTO: 3.53 X10(6)/MCL (ref 4.7–6.1)
SODIUM SERPL-SCNC: 139 MMOL/L (ref 136–145)
VIT B12 SERPL-MCNC: >2000 PG/ML (ref 213–816)
WBC # SPEC AUTO: 13.23 X10(3)/MCL (ref 4.5–11.5)

## 2024-01-29 PROCEDURE — 25000003 PHARM REV CODE 250

## 2024-01-29 PROCEDURE — 63600175 PHARM REV CODE 636 W HCPCS: Performed by: STUDENT IN AN ORGANIZED HEALTH CARE EDUCATION/TRAINING PROGRAM

## 2024-01-29 PROCEDURE — 63600175 PHARM REV CODE 636 W HCPCS

## 2024-01-29 PROCEDURE — 25000003 PHARM REV CODE 250: Performed by: STUDENT IN AN ORGANIZED HEALTH CARE EDUCATION/TRAINING PROGRAM

## 2024-01-29 PROCEDURE — 84100 ASSAY OF PHOSPHORUS: CPT

## 2024-01-29 PROCEDURE — 85025 COMPLETE CBC W/AUTO DIFF WBC: CPT

## 2024-01-29 PROCEDURE — 21400001 HC TELEMETRY ROOM

## 2024-01-29 PROCEDURE — 82746 ASSAY OF FOLIC ACID SERUM: CPT

## 2024-01-29 PROCEDURE — 83735 ASSAY OF MAGNESIUM: CPT

## 2024-01-29 PROCEDURE — 94761 N-INVAS EAR/PLS OXIMETRY MLT: CPT

## 2024-01-29 PROCEDURE — 80053 COMPREHEN METABOLIC PANEL: CPT

## 2024-01-29 PROCEDURE — 82728 ASSAY OF FERRITIN: CPT

## 2024-01-29 PROCEDURE — 82607 VITAMIN B-12: CPT

## 2024-01-29 RX ADMIN — ASPIRIN 81 MG CHEWABLE TABLET 81 MG: 81 TABLET CHEWABLE at 08:01

## 2024-01-29 RX ADMIN — PIPERACILLIN SODIUM AND TAZOBACTAM SODIUM 4.5 G: 4; .5 INJECTION, POWDER, LYOPHILIZED, FOR SOLUTION INTRAVENOUS at 02:01

## 2024-01-29 RX ADMIN — GABAPENTIN 600 MG: 300 CAPSULE ORAL at 08:01

## 2024-01-29 RX ADMIN — VANCOMYCIN HYDROCHLORIDE 1750 MG: 500 INJECTION, POWDER, LYOPHILIZED, FOR SOLUTION INTRAVENOUS at 08:01

## 2024-01-29 RX ADMIN — MUPIROCIN: 20 OINTMENT TOPICAL at 08:01

## 2024-01-29 RX ADMIN — ATORVASTATIN CALCIUM 40 MG: 40 TABLET, FILM COATED ORAL at 08:01

## 2024-01-29 RX ADMIN — POTASSIUM PHOSPHATE, MONOBASIC POTASSIUM PHOSPHATE, DIBASIC 20 MMOL: 224; 236 INJECTION, SOLUTION, CONCENTRATE INTRAVENOUS at 03:01

## 2024-01-29 RX ADMIN — PIPERACILLIN SODIUM AND TAZOBACTAM SODIUM 4.5 G: 4; .5 INJECTION, POWDER, LYOPHILIZED, FOR SOLUTION INTRAVENOUS at 06:01

## 2024-01-29 RX ADMIN — METOPROLOL TARTRATE 25 MG: 25 TABLET, FILM COATED ORAL at 08:01

## 2024-01-29 RX ADMIN — SODIUM CHLORIDE, POTASSIUM CHLORIDE, SODIUM LACTATE AND CALCIUM CHLORIDE: 600; 310; 30; 20 INJECTION, SOLUTION INTRAVENOUS at 08:01

## 2024-01-29 RX ADMIN — LISINOPRIL 2.5 MG: 2.5 TABLET ORAL at 08:01

## 2024-01-29 RX ADMIN — PIPERACILLIN SODIUM AND TAZOBACTAM SODIUM 4.5 G: 4; .5 INJECTION, POWDER, LYOPHILIZED, FOR SOLUTION INTRAVENOUS at 11:01

## 2024-01-29 RX ADMIN — FLUCONAZOLE 200 MG: 2 INJECTION, SOLUTION INTRAVENOUS at 10:01

## 2024-01-29 NOTE — PROGRESS NOTES
Palliative consult noted. Call placed to pt' son Fermin [phone] to discuss different companies, no answer. [facility] to inquire about what companies they have contracts with for palliative /hospice care; call back information left for admissions to return my call.

## 2024-01-29 NOTE — PROGRESS NOTES
[facility] Medicine Wards progress note    Resident Team: [facility] Medicine List 3  Attending Physician: Ba Lee MD  Resident: John Andrade  Intern: Chano Ely    Date of Admit: 1/25/2024    Chief Complaint     Skin Ulcer and urinary problems (Transferred from [facility] due to sacral ulcer and uti. Yadav present upon arrival with cloudy urine and also sacral ulcer noted. O2 at 88% RA. O2 @ 2l/nc in place ems. )     Subjective:      History of Present Illness:  Fermin Garcia is a 64 y.o. male with a past medical history of CVA (2020), aphasia, hypertension, neurogenic who was transferred to our emergency department [facility] for surgical services.  Patient was sent to outside ED from SNF due to altered mental and hypotension.  On review of paperwork from outside facility, it appears that patient presented to the outside facility initially hypotensive at 88/55.  He was given 2 L NS with improvement in his blood pressure to 110/72.  Labs from the outside facility displayed a leukocytosis at 18 with left shift, microcytic anemia with a H/H of 9/29.6 (MCV 72), normal lactate of 1.9, elevated troponin 113.1 pg/mL.  UA indicative of UTI.  CT abdomen and pelvis significant for large decubitus ulcer overlying distal aspect of sacrum with evidence of bony erosion and extension of infectious process into the presacral space. On admission, patient awake and alert but aphasic and unable to provide any significant information about his condition.  Contacted patient's sister Kaila Trent ([phone]) stated the patient has been bed ridden in a nursing home stroke in 2020.  She states that sacral ulcer has been present for some time now and has been actively enlarging.  Internal medicine consulted for sepsis secondary to UTI/sacral wound    Interval history:    No acute events overnight.  Patient afebrile and receiving tube feeds.  Blood cultures negative at 72 hours.  Urine culture positive for Candida  tropicalis and Proteus.  Deescalating antibiotics.  Patient is to remain on Zosyn.  Leukocytosis downtrending 13.  Patient will need goals of care conversation.    Home Medications:  Prior to Admission medications    Medication Sig Start Date End Date Taking? Authorizing Provider   amitriptyline (ELAVIL) 25 MG tablet Take 25 mg by mouth every evening.    Provider, Historical   atorvastatin (LIPITOR) 40 MG tablet Take 40 mg by mouth once daily.    Provider, Historical   carvediloL (COREG) 12.5 MG tablet Take 12.5 mg by mouth 2 (two) times daily with meals.    Provider, Historical   EScitalopram oxalate (LEXAPRO) 10 MG tablet 1 tablet (10 mg total) by Per G Tube route nightly. 22  Marcelino Moreau MD   ezetimibe (ZETIA) 10 mg tablet Take 10 mg by mouth once daily.    Provider, Historical   gabapentin (NEURONTIN) 600 MG tablet Take 1 tablet (600 mg total) by mouth 2 (two) times daily. 22  Marcelino Moreau MD   insulin aspart U-100 (NOVOLOG) 100 unit/mL injection Inject 0-5 Units into the skin 3 (three) times daily with meals. 22  Marcelino Moreau MD   insulin detemir U-100 (LEVEMIR) 100 unit/mL injection Inject 17 Units into the skin once daily. 22  Marcelino Moreau MD   lisinopriL 10 MG tablet Take 10 mg by mouth once daily.    Provider, Historical     Review of Systems:  Review of Systems   Unable to perform ROS: Patient nonverbal     Objective:   Last 24 Hour Vital Signs:  BP  Min: 119/67  Max: 138/76  Temp  Av.1 °F (36.7 °C)  Min: 97.9 °F (36.6 °C)  Max: 98.6 °F (37 °C)  Pulse  Av.2  Min: 69  Max: 103  Resp  Av  Min: 14  Max: 20  SpO2  Av.5 %  Min: 91 %  Max: 100 %  Body mass index is 27.98 kg/m².  I/O last 3 completed shifts:  In: 7930.2 [I.V.:2566.5; NG/GT:3570; IV Piggyback:1793.7]  Out: 6350 [Urine:6350]    Physical Examination:  Physical Exam  Vitals reviewed.   Constitutional:       General: He is not in acute  distress.  Cardiovascular:      Rate and Rhythm: Regular rhythm. Tachycardia present.      Pulses: Normal pulses.      Heart sounds: No murmur heard.  Pulmonary:      Effort: Pulmonary effort is normal.      Breath sounds: Normal breath sounds. No wheezing.   Abdominal:      General: There is no distension.      Palpations: Abdomen is soft.      Comments: PEG tube place, changed    Musculoskeletal:      Right lower leg: No edema.      Left lower leg: No edema.      Comments: There is a large sacral ulcer with visible bone w/ no evidence of necrotic tissue   There are several wounds of bilateral lower extremities, most significantly on left heel   Skin:     General: Skin is warm and dry.      Findings: Lesion present.   Neurological:      Mental Status: He is alert. Mental status is at baseline.      Comments: Patient difficult to understand due to aphasia  Patient is able to answer yes/no questions       Laboratory:  Most Recent Data:  CBC:   Lab Results   Component Value Date    WBC 13.23 (H) 01/29/2024    HGB 7.8 (L) 01/29/2024    HCT 26.1 (L) 01/29/2024     (H) 01/29/2024    MCV 73.9 (L) 01/29/2024    RDW 17.1 (H) 01/29/2024   WBC Differential:   Recent Labs   Lab 01/26/24  0300 01/27/24  0414 01/28/24  0337 01/29/24  0258   WBC 21.60* 14.99* 14.19* 13.23*   HGB 7.7* 7.7* 8.0* 7.8*   HCT 25.9* 25.5* 26.5* 26.1*   * 368 415* 416*   MCV 76.2* 74.8* 74.0* 73.9*   BMP:   Lab Results   Component Value Date     01/29/2024    K 3.5 01/29/2024     11/01/2022    CO2 25 01/29/2024    BUN 8.6 01/29/2024    CREATININE 0.41 (L) 01/29/2024     (H) 11/01/2022    CALCIUM 8.2 (L) 01/29/2024    MG 1.80 01/29/2024    PHOS 2.3 01/29/2024     Radiology:  Imaging Results              X-Ray Chest AP Portable (Final result)  Result time 01/25/24 08:36:43      Final result by Aneesh Ahumada MD (01/25/24 08:36:43)                   Impression:      No acute cardiopulmonary process.      Electronically  signed by: Aneesh Ahumada  Date:    01/25/2024  Time:    08:36               Narrative:    EXAMINATION:  XR CHEST AP PORTABLE    CLINICAL HISTORY:  Encounter for screening, unspecified    TECHNIQUE:  Single view of the chest    COMPARISON:  10/21/2022    FINDINGS:  No focal opacification, pleural effusion, or pneumothorax.    The cardiomediastinal silhouette is within normal limits.    No acute osseous abnormality.                                      Assessment & Plan:     1) Sepsis secondary to sacral ulcer vs.  urinary tract infection  - SIRS 3/4; qSOFA 2/3 on admit  - patient with a large sacral ulcer and multiple wounds to bilateral lower extremities  -CT abdomen pelvis from outside facility displayed ulcer with extension infectious processes to presacral space and bony erosion  -cultures collected and are so far negative at 24 hours, patient received fluid bolus at 30 mL/kg, started on broad-spectrum antibiotics. Discontinue Vanc, continue Zosyn (D5)  -Diflucan added for yeast seen in UA (D4)  - wound care consulted for sacral and other wounds  -surgery evaluated patient and did not believe patient warranted any surgical intervention at this time  -pre-albumin has increased however still markedly decreased concern for inability to heal.  Will need GOC discussions as patient remains full code.     2)  Hx of MCA stroke      Aphasia   - according to records from nursing home, patient appears to be at baseline today  - CT head ordered on admission, negative for any acute pathology  - will continue home aspirin    3) HTN  - continue home lisinopril, metoprolol   - last /73, will follow. Withhold medications if needed.    4) HLD  - continue home atorvastatin 40 mg daily    5) T2DM  -SSI    6) Microcytic anemia   -hemoglobin 7.7, down from apparent baseline, MCV 76   -Iron panel suggestive of ACD. Received iron supplementation.  -no active signs of bleeding, will look into previous scopes from past    Chano  DO [facility] Internal Medicine PGY-1  [facility] Med Surg Telemetry  01/29/2024   9:35 AM

## 2024-01-29 NOTE — PROGRESS NOTES
Patient is seen at bedside to follow up on multiple pressure injuries present on admission. Patient has been on a specialty bed and allowing some turning by staff. Sacral wound with minimal change. A mild decrease in fibrinous tissue but no new granulation tissue noted. Pt remains incontinent of stool and while a noyola is in place, leakage noted likely related to urethral tear. No changes to BLE wounds, heel protectors in place. Due to continued bed bound status and reluctance to allow staff to move him, wound is unlikely to heal. Despite tube feedings pt also is unlikely to have the protein stores needed to heal such a large cavernous wound. Per IM note, plans for discussions about hospice which I feel would be most appropriate. Wound care to continue to follow.                  Continue Regimen: clobetasol 0.05 % scalp solution Apply to scalp in the mornings 3 x a week \\n\\nMinoxidil 5% apply to scalp nightly Detail Level: Zone Otc Regimen: Minoxidil Plan: Treatment #2\\nLesions Injected: 12\\nMedication Injected: 5.0 mg/cc of Kenalog\\nTotal Volume Injected: 2.0 cc\\nAdministered by: Lori Reed PA-C\\n\\nThe risks of atrophy were reviewed with the patient.

## 2024-01-30 VITALS
TEMPERATURE: 99 F | WEIGHT: 184 LBS | RESPIRATION RATE: 18 BRPM | SYSTOLIC BLOOD PRESSURE: 115 MMHG | HEART RATE: 99 BPM | BODY MASS INDEX: 27.89 KG/M2 | HEIGHT: 68 IN | OXYGEN SATURATION: 99 % | DIASTOLIC BLOOD PRESSURE: 64 MMHG

## 2024-01-30 LAB
ALBUMIN SERPL-MCNC: 1.7 G/DL (ref 3.4–4.8)
ALBUMIN/GLOB SERPL: 0.3 RATIO (ref 1.1–2)
ALP SERPL-CCNC: 82 UNIT/L (ref 40–150)
ALT SERPL-CCNC: 8 UNIT/L (ref 0–55)
AST SERPL-CCNC: 13 UNIT/L (ref 5–34)
BACTERIA BLD CULT: NORMAL
BACTERIA BLD CULT: NORMAL
BASOPHILS # BLD AUTO: 0.03 X10(3)/MCL
BASOPHILS NFR BLD AUTO: 0.2 %
BILIRUB SERPL-MCNC: 0.3 MG/DL
BUN SERPL-MCNC: 8.8 MG/DL (ref 8.4–25.7)
CALCIUM SERPL-MCNC: 8.3 MG/DL (ref 8.8–10)
CHLORIDE SERPL-SCNC: 105 MMOL/L (ref 98–107)
CO2 SERPL-SCNC: 26 MMOL/L (ref 23–31)
CREAT SERPL-MCNC: 0.44 MG/DL (ref 0.73–1.18)
EOSINOPHIL # BLD AUTO: 0.33 X10(3)/MCL (ref 0–0.9)
EOSINOPHIL NFR BLD AUTO: 2.4 %
ERYTHROCYTE [DISTWIDTH] IN BLOOD BY AUTOMATED COUNT: 17.4 % (ref 11.5–17)
GFR SERPLBLD CREATININE-BSD FMLA CKD-EPI: >60 MLS/MIN/1.73/M2
GLOBULIN SER-MCNC: 5.3 GM/DL (ref 2.4–3.5)
GLUCOSE SERPL-MCNC: 147 MG/DL (ref 82–115)
HCT VFR BLD AUTO: 28.3 % (ref 42–52)
HGB BLD-MCNC: 8.4 G/DL (ref 14–18)
HOLD SPECIMEN: NORMAL
IMM GRANULOCYTES # BLD AUTO: 0.13 X10(3)/MCL (ref 0–0.04)
IMM GRANULOCYTES NFR BLD AUTO: 1 %
LYMPHOCYTES # BLD AUTO: 2.65 X10(3)/MCL (ref 0.6–4.6)
LYMPHOCYTES NFR BLD AUTO: 19.5 %
MAGNESIUM SERPL-MCNC: 1.8 MG/DL (ref 1.6–2.6)
MCH RBC QN AUTO: 22.2 PG (ref 27–31)
MCHC RBC AUTO-ENTMCNC: 29.7 G/DL (ref 33–36)
MCV RBC AUTO: 74.9 FL (ref 80–94)
MONOCYTES # BLD AUTO: 1.32 X10(3)/MCL (ref 0.1–1.3)
MONOCYTES NFR BLD AUTO: 9.7 %
NEUTROPHILS # BLD AUTO: 9.12 X10(3)/MCL (ref 2.1–9.2)
NEUTROPHILS NFR BLD AUTO: 67.2 %
NRBC BLD AUTO-RTO: 0 %
PHOSPHATE SERPL-MCNC: 2.8 MG/DL (ref 2.3–4.7)
PLATELET # BLD AUTO: 481 X10(3)/MCL (ref 130–400)
PMV BLD AUTO: 9.1 FL (ref 7.4–10.4)
POCT GLUCOSE: 164 MG/DL (ref 70–110)
POCT GLUCOSE: 180 MG/DL (ref 70–110)
POTASSIUM SERPL-SCNC: 3.6 MMOL/L (ref 3.5–5.1)
PROT SERPL-MCNC: 7 GM/DL (ref 5.8–7.6)
RBC # BLD AUTO: 3.78 X10(6)/MCL (ref 4.7–6.1)
SODIUM SERPL-SCNC: 138 MMOL/L (ref 136–145)
WBC # SPEC AUTO: 13.58 X10(3)/MCL (ref 4.5–11.5)

## 2024-01-30 PROCEDURE — 80053 COMPREHEN METABOLIC PANEL: CPT

## 2024-01-30 PROCEDURE — 83735 ASSAY OF MAGNESIUM: CPT

## 2024-01-30 PROCEDURE — 94761 N-INVAS EAR/PLS OXIMETRY MLT: CPT

## 2024-01-30 PROCEDURE — 84100 ASSAY OF PHOSPHORUS: CPT

## 2024-01-30 PROCEDURE — 63600175 PHARM REV CODE 636 W HCPCS

## 2024-01-30 PROCEDURE — 85025 COMPLETE CBC W/AUTO DIFF WBC: CPT

## 2024-01-30 PROCEDURE — 25000003 PHARM REV CODE 250

## 2024-01-30 PROCEDURE — 25000003 PHARM REV CODE 250: Performed by: STUDENT IN AN ORGANIZED HEALTH CARE EDUCATION/TRAINING PROGRAM

## 2024-01-30 RX ORDER — MAGNESIUM SULFATE 1 G/100ML
1 INJECTION INTRAVENOUS ONCE
Status: COMPLETED | OUTPATIENT
Start: 2024-01-30 | End: 2024-01-30

## 2024-01-30 RX ORDER — NAPROXEN SODIUM 220 MG/1
81 TABLET, FILM COATED ORAL DAILY
Qty: 30 TABLET | Refills: 0 | Status: SHIPPED | OUTPATIENT
Start: 2024-01-31 | End: 2025-01-30

## 2024-01-30 RX ORDER — FLUCONAZOLE 200 MG/1
200 TABLET ORAL DAILY
Qty: 10 TABLET | Refills: 0 | Status: SHIPPED | OUTPATIENT
Start: 2024-01-30 | End: 2024-02-09

## 2024-01-30 RX ORDER — LISINOPRIL 2.5 MG/1
2.5 TABLET ORAL DAILY
Qty: 90 TABLET | Refills: 3 | Status: SHIPPED | OUTPATIENT
Start: 2024-01-31 | End: 2025-01-30

## 2024-01-30 RX ORDER — METOPROLOL TARTRATE 25 MG/1
25 TABLET, FILM COATED ORAL 2 TIMES DAILY
Qty: 60 TABLET | Refills: 11 | Status: SHIPPED | OUTPATIENT
Start: 2024-01-30 | End: 2025-01-29

## 2024-01-30 RX ADMIN — PIPERACILLIN SODIUM AND TAZOBACTAM SODIUM 4.5 G: 4; .5 INJECTION, POWDER, LYOPHILIZED, FOR SOLUTION INTRAVENOUS at 12:01

## 2024-01-30 RX ADMIN — METOPROLOL TARTRATE 25 MG: 25 TABLET, FILM COATED ORAL at 09:01

## 2024-01-30 RX ADMIN — MAGNESIUM SULFATE IN DEXTROSE 1 G: 10 INJECTION, SOLUTION INTRAVENOUS at 09:01

## 2024-01-30 RX ADMIN — PIPERACILLIN SODIUM AND TAZOBACTAM SODIUM 4.5 G: 4; .5 INJECTION, POWDER, LYOPHILIZED, FOR SOLUTION INTRAVENOUS at 03:01

## 2024-01-30 RX ADMIN — ATORVASTATIN CALCIUM 40 MG: 40 TABLET, FILM COATED ORAL at 09:01

## 2024-01-30 RX ADMIN — LISINOPRIL 2.5 MG: 2.5 TABLET ORAL at 09:01

## 2024-01-30 RX ADMIN — FLUCONAZOLE 200 MG: 2 INJECTION, SOLUTION INTRAVENOUS at 12:01

## 2024-01-30 RX ADMIN — MUPIROCIN: 20 OINTMENT TOPICAL at 09:01

## 2024-01-30 RX ADMIN — GABAPENTIN 600 MG: 300 CAPSULE ORAL at 09:01

## 2024-01-30 RX ADMIN — ASPIRIN 81 MG CHEWABLE TABLET 81 MG: 81 TABLET CHEWABLE at 09:01

## 2024-01-30 RX ADMIN — POTASSIUM PHOSPHATE, MONOBASIC POTASSIUM PHOSPHATE, DIBASIC 15 MMOL: 224; 236 INJECTION, SOLUTION, CONCENTRATE INTRAVENOUS at 12:01

## 2024-01-30 NOTE — DISCHARGE SUMMARY
[facility] Internal Medicine Discharge Summary    Admitting Physician: Clarisa Campuzano MD  Attending Physician: Ba Lee MD  Date of Admit: 1/25/2024  Date of Discharge: 1/30/2024    Condition: Stable  Outcome: Condition has improved and patient is now ready for discharge.  DISPOSITION: Nursing Facility    Discharge Diagnoses     Patient Active Problem List   Diagnosis    Diabetes Mellitus    Aphasia    Acute right hemiparesis    Nontraumatic subcortical hemorrhage of left cerebral hemisphere    Class 2 severe obesity due to excess calories with serious comorbidity and body mass index (BMI) of 36.0 to 36.9 in adult    Hypernatremia    Sepsis without acute organ dysfunction    Primary hypertension    Gross hematuria    S/P percutaneous endoscopic gastrostomy (PEG) tube placement       Principal Problem:  Sepsis without acute organ dysfunction    Consultants and Procedures     Consultants:  IP CONSULT TO HOSPITAL MEDICINE  IP CONSULT TO PICC TEAM ([facility])  WOUND CARE CONSULT  IP CONSULT TO REGISTERED DIETITIAN/NUTRITIONIST  IP CONSULT TO REGISTERED DIETITIAN/NUTRITIONIST  IP CONSULT TO GI  IP CONSULT TO GENERAL SURGERY  IP CONSULT TO SOCIAL WORK/CASE MANAGEMENT    Procedures:   * No surgery found *     Brief Admission History      Fermin Garcia is a 64 y.o. male with a past medical history of CVA (2020), aphasia, hypertension, neurogenic who was transferred to our emergency department [place] for surgical services.  Patient was sent to outside ED from SNF due to altered mental and hypotension.  On review of paperwork from outside facility, it appears that patient presented to the outside facility initially hypotensive at 88/55.  He was given 2 L NS with improvement in his blood pressure to 110/72.  Labs from the outside facility displayed a leukocytosis at 18 with left shift, microcytic anemia with a H/H of 9/29.6 (MCV 72), normal lactate of 1.9, elevated troponin 113.1 pg/mL.  UA indicative of UTI.  CT  abdomen and pelvis significant for large decubitus ulcer overlying distal aspect of sacrum with evidence of bony erosion and extension of infectious process into the presacral space. On admission, patient awake and alert but aphasic and unable to provide any significant information about his condition.  Contacted patient's sister Kaila Trent ([phone]) stated the patient has been bed ridden in a nursing home stroke in 2020.  She states that sacral ulcer has been present for some time now and has been actively enlarging.  Internal medicine consulted for sepsis secondary to UTI/sacral wound     Hospital Course with Pertinent Findings     Patient admitted to the Internal Medicine team for sepsis secondary to sacral ulcer versus UTI.  Patient was SIRS 3/4 qSOFA 2/3 on admission.  Patient was started on empiric antibiotic coverage with vancomycin and Zosyn.  Blood pressure responded to initial fluid resuscitation.  Blood cultures obtained on admission remained negative at 96 hours.  UA was obtained which displayed Candida tropicalis and Proteus.  Patient was started on Diflucan 200 mg a day for candiduria.  Wound Care also evaluated sacral ulcer.  Surgery was also consulted regarding sacral ulcer and deemed that patient may be a candidate for diverting colostomy on an outpatient basis after further cardiac workup.  GI was also consulted for PEG change as site was discolored and there was reported difficulty feeding patient by son.  Tube was changed successfully especially since patient displayed evidence of starvation ketosis on admission with 4+ ketones in urine  Antibiotics were eventually deescalated to Zosyn.  Patient was deemed acceptable for discharge as he remained afebrile and leukocytosis improved.  On discharge, patient did have a leukocytosis 13 but this is believed to be secondary to sacral ulcer/chronic osteomyelitis of site.  Patient should complete 14 day course of fluconazole 200 mg daily.    Extensive  "conversation was had with patient's family during admission about next steps moving forward.  Thoroughly explained the difference between palliative and hospice care.  Also explained to the patient's family overall low likelihood that sacral ulcer will heal and likely need for repeat hospitalization.  Ultimately, decision was made to pursue palliative care at this time.  Case management called nursing home who states that their medical director runs palliative program initiate for patient.    Discharge physical exam:  Vitals  BP: 115/64  Temp: 98.6 °F (37 °C)  Temp Source: Oral  Pulse: 99  Resp: 18  SpO2: 99 %  Height: 5' 8" (172.7 cm)  Weight: 83.5 kg (184 lb)    Physical Exam  Vitals reviewed.   Constitutional:       General: He is not in acute distress.  Cardiovascular:      Rate and Rhythm: Regular rhythm. Tachycardia present.      Pulses: Normal pulses.      Heart sounds: No murmur heard.  Pulmonary:      Effort: Pulmonary effort is normal.      Breath sounds: Normal breath sounds. No wheezing.   Abdominal:      General: There is no distension.      Palpations: Abdomen is soft.      Comments: PEG tube place, changed    Musculoskeletal:      Right lower leg: No edema.      Left lower leg: No edema.      Comments: There is a large sacral ulcer with visible bone w/ no evidence of necrotic tissue   There are several wounds of bilateral lower extremities, most significantly on left heel   Skin:     General: Skin is warm and dry.      Findings: Lesion present.   Neurological:      Mental Status: He is alert. Mental status is at baseline.      Comments: Patient difficult to understand due to aphasia  Patient is able to answer yes/no questions        TIME SPENT ON DISCHARGE: 60 minutes    Discharge Medications        Medication List        START taking these medications      aspirin 81 MG Chew  1 tablet (81 mg total) by Per G Tube route once daily.  Start taking on: January 31, 2024     fluconazole 200 MG Tab  Commonly " known as: DIFLUCAN  Take 1 tablet (200 mg total) by mouth once daily. Repeat dosing after 72 hours if no relief in symptoms. for 10 days     metoprolol tartrate 25 MG tablet  Commonly known as: LOPRESSOR  1 tablet (25 mg total) by Per G Tube route 2 (two) times daily.            CHANGE how you take these medications      lisinopriL 2.5 MG tablet  Commonly known as: PRINIVIL,ZESTRIL  1 tablet (2.5 mg total) by Per G Tube route once daily.  Start taking on: January 31, 2024  What changed:   medication strength  how much to take  how to take this            CONTINUE taking these medications      amitriptyline 25 MG tablet  Commonly known as: ELAVIL     atorvastatin 40 MG tablet  Commonly known as: LIPITOR     ezetimibe 10 mg tablet  Commonly known as: ZETIA     gabapentin 600 MG tablet  Commonly known as: NEURONTIN  Take 1 tablet (600 mg total) by mouth 2 (two) times daily.     insulin aspart U-100 100 unit/mL injection  Commonly known as: NovoLOG  Inject 0-5 Units into the skin 3 (three) times daily with meals.     insulin detemir U-100 100 unit/mL injection  Commonly known as: Levemir  Inject 17 Units into the skin once daily.            STOP taking these medications      carvediloL 12.5 MG tablet  Commonly known as: COREG     EScitalopram oxalate 10 MG tablet  Commonly known as: LEXAPRO               Where to Get Your Medications        These medications were sent to [facility] - [address] [address]      Phone: [phone]   aspirin 81 MG Chew  fluconazole 200 MG Tab  lisinopriL 2.5 MG tablet  metoprolol tartrate 25 MG tablet         Discharge Information:     -patient will be discharged back to nursing home  -recommend continued feeding through PEG tube (receiving impact peptide while inpatient)  -recommending completing 14 day course of fluconazole 200 mg daily to end on 2/9  -recommend monitoring patient's vitals and reinitiating  antihypertensives as appropriate  -recommend continued wound care  -recommend further goals of care conversation enquiring if patient and family would pursue diverting colostomy    Chano Ely, DO  [facility] Internal Medicine PGY-1

## 2024-01-30 NOTE — PLAN OF CARE
01/26/24 1444   Discharge Assessment   Assessment Type Discharge Planning Assessment   Confirmed/corrected address, phone number and insurance Yes   Confirmed Demographics Correct on Facesheet   Source of Information patient  (Son Fermin [phone])   Reason For Admission Sepsis, Stage 4 ulcer   People in Home facility resident  ([facility])   Facility Arrived From: [facility]       
  Problem: Adult Inpatient Plan of Care  Goal: Plan of Care Review  Outcome: Met  Goal: Patient-Specific Goal (Individualized)  Outcome: Met  Goal: Absence of Hospital-Acquired Illness or Injury  Outcome: Met  Goal: Optimal Comfort and Wellbeing  Outcome: Met  Goal: Readiness for Transition of Care  Outcome: Met     Problem: Infection  Goal: Absence of Infection Signs and Symptoms  Outcome: Met     Problem: Diabetes Comorbidity  Goal: Blood Glucose Level Within Targeted Range  Outcome: Met     Problem: Skin Injury Risk Increased  Goal: Skin Health and Integrity  Outcome: Met     Problem: Impaired Wound Healing  Goal: Optimal Wound Healing  Outcome: Met     Problem: Adjustment to Illness (Sepsis/Septic Shock)  Goal: Optimal Coping  Outcome: Met     Problem: Bleeding (Sepsis/Septic Shock)  Goal: Absence of Bleeding  Outcome: Met     Problem: Glycemic Control Impaired (Sepsis/Septic Shock)  Goal: Blood Glucose Level Within Desired Range  Outcome: Met     Problem: Infection Progression (Sepsis/Septic Shock)  Goal: Absence of Infection Signs and Symptoms  Outcome: Met     Problem: Nutrition Impaired (Sepsis/Septic Shock)  Goal: Optimal Nutrition Intake  Outcome: Met     
  Problem: Adult Inpatient Plan of Care  Goal: Plan of Care Review  Outcome: Ongoing, Progressing  Flowsheets (Taken 1/28/2024 1647)  Plan of Care Reviewed With: patient  Goal: Absence of Hospital-Acquired Illness or Injury  Outcome: Ongoing, Progressing  Intervention: Identify and Manage Fall Risk  Flowsheets (Taken 1/28/2024 1647)  Safety Promotion/Fall Prevention:   assistive device/personal item within reach   Fall Risk signage in place   high risk medications identified   medications reviewed   lighting adjusted   side rails raised x 3  Intervention: Prevent Skin Injury  Flowsheets (Taken 1/28/2024 1647)  Body Position: turned  Skin Protection:   adhesive use limited   tubing/devices free from skin contact  Intervention: Prevent and Manage VTE (Venous Thromboembolism) Risk  Flowsheets (Taken 1/28/2024 1647)  Activity Management: Patient unable to perform activities  VTE Prevention/Management:   remove, assess skin, and reapply sequential compression device   bleeding risk assessed  Intervention: Prevent Infection  Flowsheets (Taken 1/28/2024 1647)  Infection Prevention: rest/sleep promoted     
  Problem: Adult Inpatient Plan of Care  Goal: Plan of Care Review  Outcome: Ongoing, Progressing  Goal: Absence of Hospital-Acquired Illness or Injury  Outcome: Ongoing, Progressing     Problem: Infection  Goal: Absence of Infection Signs and Symptoms  Outcome: Ongoing, Progressing     Problem: Diabetes Comorbidity  Goal: Blood Glucose Level Within Targeted Range  Outcome: Ongoing, Progressing     Problem: Skin Injury Risk Increased  Goal: Skin Health and Integrity  Outcome: Ongoing, Progressing     
  Problem: Adult Inpatient Plan of Care  Goal: Plan of Care Review  Outcome: Ongoing, Progressing  Goal: Patient-Specific Goal (Individualized)  Outcome: Ongoing, Progressing  Goal: Absence of Hospital-Acquired Illness or Injury  Outcome: Ongoing, Progressing     Problem: Infection  Goal: Absence of Infection Signs and Symptoms  Outcome: Ongoing, Progressing     
  Problem: Adult Inpatient Plan of Care  Goal: Plan of Care Review  Outcome: Ongoing, Progressing  Goal: Patient-Specific Goal (Individualized)  Outcome: Ongoing, Progressing  Goal: Absence of Hospital-Acquired Illness or Injury  Outcome: Ongoing, Progressing  Goal: Optimal Comfort and Wellbeing  Outcome: Ongoing, Progressing  Goal: Readiness for Transition of Care  Outcome: Ongoing, Progressing     Problem: Infection  Goal: Absence of Infection Signs and Symptoms  Outcome: Ongoing, Progressing     Problem: Diabetes Comorbidity  Goal: Blood Glucose Level Within Targeted Range  Outcome: Ongoing, Progressing     Problem: Skin Injury Risk Increased  Goal: Skin Health and Integrity  Outcome: Ongoing, Progressing     
  Problem: Adult Inpatient Plan of Care  Goal: Plan of Care Review  Outcome: Ongoing, Progressing  Goal: Patient-Specific Goal (Individualized)  Outcome: Ongoing, Progressing  Goal: Absence of Hospital-Acquired Illness or Injury  Outcome: Ongoing, Progressing  Goal: Optimal Comfort and Wellbeing  Outcome: Ongoing, Progressing  Goal: Readiness for Transition of Care  Outcome: Ongoing, Progressing     Problem: Infection  Goal: Absence of Infection Signs and Symptoms  Outcome: Ongoing, Progressing     Problem: Diabetes Comorbidity  Goal: Blood Glucose Level Within Targeted Range  Outcome: Ongoing, Progressing     Problem: Skin Injury Risk Increased  Goal: Skin Health and Integrity  Outcome: Ongoing, Progressing     
  Problem: Adult Inpatient Plan of Care  Goal: Plan of Care Review  Outcome: Ongoing, Progressing  Goal: Patient-Specific Goal (Individualized)  Outcome: Ongoing, Progressing  Goal: Absence of Hospital-Acquired Illness or Injury  Outcome: Ongoing, Progressing  Goal: Optimal Comfort and Wellbeing  Outcome: Ongoing, Progressing  Goal: Readiness for Transition of Care  Outcome: Ongoing, Progressing     Problem: Infection  Goal: Absence of Infection Signs and Symptoms  Outcome: Ongoing, Progressing     Problem: Diabetes Comorbidity  Goal: Blood Glucose Level Within Targeted Range  Outcome: Ongoing, Progressing     Problem: Skin Injury Risk Increased  Goal: Skin Health and Integrity  Outcome: Ongoing, Progressing     Problem: Impaired Wound Healing  Goal: Optimal Wound Healing  Outcome: Ongoing, Progressing     
Principal Discharge DX:	Screening for viral disease

## 2024-01-30 NOTE — PHYSICIAN QUERY
PT Name: Fermin Garcia  MR #: [id number]     DOCUMENTATION CLARIFICATION     CDS/: Clarisa Khan               Contact information:  This form is a permanent document in the medical record.     Query Date: 2024    By submitting this query, we are merely seeking further clarification of documentation.  Please utilize your independent clinical judgment when addressing the question(s) below.  Provider, please provide the integumentary diagnosis related to the documentation of Lateral LLE:   The Medical Record contains the followin/25 Wound care consult  Lateral LLE with 5x3cm fibrinous wound.   Will defer plan of care to IM team as wound care options are limited due to overall clinical picture. Palliative care may be appropriate. A specialty air bed is ordered. New orders placed and wound care to continue to follow.    Lateral LLE           HM PN   There are several wounds of bilateral lower extremities    - LDA  Left lateral leg: Full thickness tissue loss. Subcutaneous fat may be visible but bone, tendon or muscle are not exposed       wound care PN  No changes to BLE wounds, heel protectors in place. Due to continued bed bound status and reluctance to allow staff to move him, wound is unlikely to heal.  Per IM note, plans for discussions about hospice which I feel would be most appropriate. Wound care to continue to follow.        The clinical guidelines noted are only a system guideline. It does not replace the providers clinical judgment.    Per the National Pressure Injury Advisory Panel:   A pressure injury is localized damage to the skin and underlying soft tissue usually over a bony prominence or related to a medical or other device. The injury can present as intact skin or an open ulcer and may be painful. The injury occurs as a result of intense and/or prolonged pressure or pressure in combination with shear. The tolerance of soft tissue for pressure and shear may  also be affected by microclimate, nutrition, perfusion, co-morbidities and condition of the soft tissue.       Stage 1 Pressure Injury:  Intact skin with a localized area of non-blanchable erythema, which may appear differently in darkly pigmented skin. Color changes do not include purple or maroon discoloration; these may indicate deep tissue pressure injury.    Stage 2 Pressure Injury:  Partial-thickness loss of skin with exposed dermis. The wound bed is viable, pink or red, moist, and may also present as an intact or ruptured serum-filled blister.    Stage 3 Pressure Injury:  Full-thickness loss of skin, in which adipose (fat) is visible in the ulcer and granulation tissue and epibole (rolled wound edges) are often present. Slough and/or eschar may be visible. Undermining and tunneling may occur.    Stage 4 Pressure Injury:  Full-thickness skin and tissue loss with exposed or directly palpable fascia, muscle, tendon, ligament, cartilage or bone in the ulcer. Slough and/or eschar may be visible. Epibole (rolled edges), undermining and/or tunneling often occur.    Unstageable Pressure Injury:  Full-thickness skin and tissue loss in which the extent of tissue damage within the ulcer cannot be confirmed because it is obscured by slough or eschar. If slough or eschar is removed, a Stage 3 or Stage 4 pressure injury will be revealed.        Provider, please provide the integumentary diagnosis related to the documentation of Lateral LLE:     [ x  ] Pressure Injury/Decubitus Ulcer, Stage 3   [   ] Pressure Injury/Decubitus Ulcer, Other stage (please specify):___________________   [   ] Other Integumentary Diagnosis (please specify):______________       Please document in your progress notes daily for the duration of treatment until resolved and include in your discharge summary.    Reference:    DALE Bocanegra., Prabhu, MARYSOL MESSINA., Goldberg, M., DALE Lopez, DALE Valentin, & FELISHA Johnson (2016). Revised National Pressure Ulcer  Advisory Panel Pressure Injury Staging System: Revised Pressure Injury Staging System. J Wound Ostomy Continence Nurs, 43(6), 585-597. doi:10.1097/won.0262149166612525    Form No.[id number]

## 2024-01-30 NOTE — PROGRESS NOTES
Call placed to [facility], [phone], spoke with Tatum, palliative care is provided by their medical director. Pt can return with palliative orders upon d/c.

## 2024-01-30 NOTE — PROGRESS NOTES
Pt will return to [facility], nurse can call report to [facility] at [phone]. [facility] will transport.

## 2024-01-30 NOTE — PROGRESS NOTES
Inpatient Nutrition Assessment    Admit Date: 1/25/2024   Total duration of encounter: 5 days   Patient Age: 64 y.o.    Nutrition Recommendation/Prescription     TF via peg Impact Peptid 1.5 @ 20 ml/hr with goal rate of 60 ml/hr providing 2070 kcal, 130 gm protein, 1060 ml free water based on 23 H run time: Flush with 150 ml water Q 4 H  Darius (provides 90 kcal, 2.5 g protein per serving) BID flush with 120 ml room temp water via peg  Monitor Weight Biweekly   Consider Pleasure feeds of Puree as tolerated    Communication of Recommendations: reviewed with nurse    Nutrition Assessment     Malnutrition Assessment/Nutrition-Focused Physical Exam    Malnutrition Context: chronic illness (01/26/24 1030)  Malnutrition Level:  (unable to fully assess) (01/26/24 1030)  Energy Intake (Malnutrition):  (Unable to obtain, receiving pleasure feeds & TF PTA however peg is dirty indicating unused) (01/26/24 1030)  Weight Loss (Malnutrition):  (unable to obtain, no wt hx per EMR) (01/26/24 1030)              Muscle Mass (Malnutrition): severe depletion (01/30/24 0955)  [place] (Muscle Loss): severe depletion                                A minimum of two characteristics is recommended for diagnosis of either severe or non-severe malnutrition.    Chart Review    Reason Seen: follow-up    Malnutrition Screening Tool Results   Have you recently lost weight without trying?: No  Have you been eating poorly because of a decreased appetite?: No (has been tube fed at nursing home)   MST Score: 0   Diagnosis:  Sepsis d/t sacral ulcer vs UTI, Aphasia h/o Stroke, HTN, HLD, DM, Hypokalemia, Hypophosphatemia, Concern for starvation ketoacidosis    Relevant Medical History: CVA, Aphasia, HTN, Neurogenic    Scheduled Medications:  aspirin, 81 mg, Daily  atorvastatin, 40 mg, Daily  fluconazole (DIFLUCAN) IV (PEDS and ADULTS), 200 mg, Q24H  gabapentin, 600 mg, BID  lisinopriL, 2.5 mg, Daily  magnesium sulfate IVPB, 1 g, Once  metoprolol  tartrate, 25 mg, BID  piperacillin-tazobactam (Zosyn) IV (PEDS and ADULTS) (extended infusion is not appropriate), 4.5 g, Q8H  potassium phosphate IVPB, 15 mmol, Once    Continuous Infusions:  lactated ringers, Last Rate: 100 mL/hr at 01/29/24 1750    PRN Medications: acetaminophen, dextrose 10%, dextrose 10%, glucagon (human recombinant), glucose, glucose, HYDROcodone-acetaminophen, insulin aspart U-100, melatonin, sodium chloride 0.9%    Calorie Containing IV Medications: no significant kcals from medications at this time    Recent Labs   Lab 01/25/24  1424 01/25/24  1429 01/25/24  1429 01/25/24  1431 01/26/24  0300 01/26/24  1208 01/26/24  2026 01/27/24  0413 01/27/24  0414 01/28/24  0004 01/28/24  0337 01/28/24  0624 01/29/24  0258 01/30/24  0305   NA  --  142   < >  --  142 140 138 136  --  135* 136  --  139 138   K  --  3.9   < >  --  3.3* 3.3* 3.2* 3.5  --  3.7 3.6  --  3.5 3.6   CALCIUM  --  8.7*   < >  --  8.5* 8.3* 8.0* 7.8*  --  8.0* 8.0*  --  8.2* 8.3*   PHOS  --   --    < >  --  2.2* 3.0 2.4 2.2*  --  2.5 2.4  --  2.3 2.8   MG  --  2.20   < >  --  2.00 1.80 1.80 1.70  --  1.80 1.80  --  1.80 1.80   CHLORIDE  --  110*   < >  --  111* 108* 105 106  --  102 104  --  106 105   CO2  --  20*   < >  --  21* 23 25 24  --  24 25  --  25 26   BUN  --  23.7   < >  --  15.9 11.2 8.1* 7.1*  --  8.2* 8.3*  --  8.6 8.8   CREATININE  --  0.49*   < >  --  0.49* 0.50* 0.44* 0.39*  --  0.42* 0.43*  --  0.41* 0.44*   EGFRNORACEVR  --  >60   < >  --  >60 >60 >60 >60  --  >60 >60  --  >60 >60   GLUCOSE  --  88   < >  --  63* 103 97 104  --  158* 133*  --  146* 147*   BILITOT  --  0.7  --   --  0.6  --   --  0.5  --   --  0.4  --  0.3 0.3   ALKPHOS  --  114  --   --  115  --   --  82  --   --  101  --  87 82   ALT  --  12  --   --  10  --   --  8  --   --  10  --  10 8   AST  --  20  --   --  18  --   --  15  --   --  15  --  15 13   ALBUMIN  --  1.8*  --   --  1.6*  --   --  1.4*  --   --  1.5*  --  1.6* 1.7*   PREALB  --    "--   --  6.0*  --   --   --   --   --   --   --  7.0*  --   --    CRP  --  269.90*  --   --   --  204.40*  --   --   --   --   --   --   --   --    TRIG  --  48  --   --   --   --   --   --   --   --   --   --   --   --    HGBA1C 5.9  --   --   --  5.9  --   --   --   --   --   --   --   --   --    LIPASE  --  <4  --   --   --   --   --   --   --   --   --   --   --   --    WBC  --   --   --   --  21.60*  --   --   --  14.99*  --  14.19*  --  13.23* 13.58*   HGB  --   --   --   --  7.7*  --   --   --  7.7*  --  8.0*  --  7.8* 8.4*   HCT  --   --   --   --  25.9*  --   --   --  25.5*  --  26.5*  --  26.1* 28.3*    < > = values in this interval not displayed.       Nutrition Orders:  No diet orders on file      Appetite/Oral Intake: NPO/NPO  Factors Affecting Nutritional Intake: difficulty/impaired swallowing and NPO  Food/Buddhism/Cultural Preferences: unable to obtain  Food Allergies: no known food allergies  Last Bowel Movement: 01/27/24  Wound(s):  wounds to BLE & large sacral wound    Comments    1/30/24 -- Pt receiving TF via peg, meeting 100% of nutrition needs; Continue Darius BID via peg to promote wound healing; no bed scale available to weigh patients; pt aphasic, shakes head no to n/v or abdominal pain    1/26/24 -- Pt from NH, peg in place, GI consulted for functioning of peg d/t nsing reports unable to flush & dirty; GI NP able to flush peg but may need to be replaced -- TF recommendations provided to begin TF as medically able; Pt on Puree pleasure feeds & bolus TF at NH, ? If patient receiving TF as prescribed at NH; No recent weight noted per EMR, severe temple wasting noted indicating significant wt loss; No bed scale available to verify current weight; Large sacral wound reported -- suggest Darius BID via peg    Anthropometrics    Height: 5' 8" (172.7 cm),    Last Weight: 83.5 kg (184 lb) (01/26/24 1136), Weight Method: Bed Scale  BMI (Calculated): 28  BMI Classification: overweight (BMI 25-29.9)   "      Ideal Body Weight (IBW), Male: 154 lb     % Ideal Body Weight, Male (lb): 119.48 %                          Usual Weight Provided By: unable to obtain usual weight    Wt Readings from Last 5 Encounters:   01/26/24 83.5 kg (184 lb)   10/13/22 116.6 kg (257 lb)     Weight Change(s) Since Admission:   Wt Readings from Last 1 Encounters:   01/26/24 1136 83.5 kg (184 lb)   01/25/24 1243 83.5 kg (184 lb)   01/25/24 0730 85 kg (187 lb 6.3 oz)   Admit Weight: 85 kg (187 lb 6.3 oz) (01/25/24 0730), Weight Method: Bed Scale    Estimated Needs    Weight Used For Calorie Calculations: 84 kg (185 lb 3 oz)  Energy Calorie Requirements (kcal): 2442-1062 kcal (25-28 kcal/kg)  Energy Need Method: Kcal/kg  Weight Used For Protein Calculations: 84 kg (185 lb 3 oz)  Protein Requirements: 109-126 gm (1.3 - 1.5 gm/kg)  Fluid Requirements (mL): 2100 - 2350 ml (25 - 28 kcal/kg)    Enteral Nutrition     Formula: Impact Peptide 1.5 Sang  Rate/Volume: 60 ml/hr  Water Flushes: 150 ml Q 4 H  Additives/Modulars: Darius  Route: PEG tube  Method: continuous  Total Nutrition Provided by Tube Feeding, Additives, and Flushes:  Calories Provided  2160 kcal/d, 100% needs   Protein Provided  135 g/d, 107% needs   Fluid Provided  2200 ml/d, 100% needs   Continuous feeding calculations based on estimated 23 hr/d run time unless otherwise stated.    Parenteral Nutrition     Patient not receiving parenteral nutrition support at this time.    Evaluation of Received Nutrient Intake    Calories: meeting estimated needs  Protein: not meeting estimated needs    Patient Education     Not applicable.    Nutrition Diagnosis     PES: Inadequate oral intake related to chronic illness as evidenced by CVA, peg feedings. (active)       Nutrition Interventions     Intervention(s): modified composition of enteral nutrition, modified rate of enteral nutrition, commercial beverage, multivitamin/mineral supplement therapy, and collaboration with other providers    Goal:  Meet greater than 80% of nutritional needs by follow-up. (goal met)  Goal: Tolerate enteral feeding at goal rate by follow-up. (goal met)    Nutrition Goals & Monitoring     Dietitian will monitor: energy intake, enteral nutrition intake, weight change, electrolyte/renal panel, glucose/endocrine profile, and gastrointestinal profile    Nutrition Risk/Follow-Up: high (follow-up in 1-4 days)   Please consult if re-assessment needed sooner.

## 2024-01-30 NOTE — PHYSICIAN QUERY
PT Name: Fermin Garcia  MR #: [id number]     DOCUMENTATION CLARIFICATION     CDS/: Clarisa Khan RN          Contact Information: [email or web address]   This form is a permanent document in the medical record.     Query Date: 2024    By submitting this query, we are merely seeking further clarification of documentation.  Please utilize your independent clinical judgment when addressing the question(s) below.  Provider, please provide the integumentary diagnosis related to the documentation of Left heel:   The Medical Record contains the followin/25 Wound care consult  Left heel with intact dry blood blister vs SDTI. Will defer plan of care to IM team as wound care options are limited due to overall clinical picture. Palliative care may be appropriate. A specialty air bed is ordered. New orders placed and wound care to continue to follow.   Left heel         HM PN   There are several wounds of bilateral lower extremities, most significantly on left heel       wound care PN  No changes to BLE wounds, heel protectors in place. Due to continued bed bound status and reluctance to allow staff to move him, wound is unlikely to heal.  Per IM note, plans for discussions about hospice which I feel would be most appropriate. Wound care to continue to follow.        The clinical guidelines noted are only a system guideline. It does not replace the providers clinical judgment.    Per the National Pressure Injury Advisory Panel:   A pressure injury is localized damage to the skin and underlying soft tissue usually over a bony prominence or related to a medical or other device. The injury can present as intact skin or an open ulcer and may be painful. The injury occurs as a result of intense and/or prolonged pressure or pressure in combination with shear. The tolerance of soft tissue for pressure and shear may also be affected by microclimate, nutrition, perfusion, co-morbidities and  condition of the soft tissue.       Stage 1 Pressure Injury:  Intact skin with a localized area of non-blanchable erythema, which may appear differently in darkly pigmented skin. Color changes do not include purple or maroon discoloration; these may indicate deep tissue pressure injury.    Stage 2 Pressure Injury:  Partial-thickness loss of skin with exposed dermis. The wound bed is viable, pink or red, moist, and may also present as an intact or ruptured serum-filled blister.    Stage 3 Pressure Injury:  Full-thickness loss of skin, in which adipose (fat) is visible in the ulcer and granulation tissue and epibole (rolled wound edges) are often present. Slough and/or eschar may be visible. Undermining and tunneling may occur.    Stage 4 Pressure Injury:  Full-thickness skin and tissue loss with exposed or directly palpable fascia, muscle, tendon, ligament, cartilage or bone in the ulcer. Slough and/or eschar may be visible. Epibole (rolled edges), undermining and/or tunneling often occur.    Unstageable Pressure Injury:  Full-thickness skin and tissue loss in which the extent of tissue damage within the ulcer cannot be confirmed because it is obscured by slough or eschar. If slough or eschar is removed, a Stage 3 or Stage 4 pressure injury will be revealed.    Deep Tissue Pressure Injury:  Intact or non-intact skin with localized area of persistent non-blanchable deep red, maroon, purple discoloration or epidermal separation revealing a dark wound bed or blood filled blister. This injury results from intense and/or prolonged pressure and shear forces at the bone-muscle interface. The wound may evolve rapidly to reveal the actual extent of tissue injury, or may resolve without tissue loss. If necrotic tissue, subcutaneous tissue, granulation tissue, fascia, muscle or other underlying structures are visible, this indicates a full thickness pressure injury (Unstageable, Stage 3 or Stage 4). Do not use DTPI to describe  vascular, traumatic, neuropathic, or dermatologic conditions.       Provider, please provide the integumentary diagnosis related to the documentation of Left heel:     [ x  ] Pressure Injury/Decubitus Ulcer, Stage 2   [   ] Deep Tissue Pressure Injury   [   ] Other Integumentary Diagnosis (please specify):______________       Please document in your progress notes daily for the duration of treatment until resolved and include in your discharge summary.    Reference:    DALE Bocanegra., MARYSOL Pelletier., Goldberg, M., DALE Lopez, DALE Valentin, & SIDDHARTH Johnson. (2016). Revised National Pressure Ulcer Advisory Panel Pressure Injury Staging System: Revised Pressure Injury Staging System. J Wound Ostomy Continence Nurs, 43(6), 585-597. doi:10.1097/won.[id number]    Form No.[id number]

## 2024-01-30 NOTE — PROGRESS NOTES
Faculty addendum:     I have reviewed and concur with the resident's history, physical, assessment, and plan.  I have discussed with him all issues related to the diagnosis, workup and treatment plan.Care provided as reasonable and necessary.     Patient seen and examined this morning.    Discharging back to the nursing home today with palliative care services.      Fully dictated resident discharge summary forthcoming

## 2024-01-30 NOTE — PHYSICIAN QUERY
PT Name: Fermin Garcia  MR #: [id number]     DOCUMENTATION CLARIFICATION      CDS/: Clarisa Khan RN          Contact Information: [email or web address]   This form is a permanent document in the medical record.     Query Date: January 30, 2024    By submitting this query, we are merely seeking further clarification of documentation. Please utilize your independent clinical judgment when addressing the question(s) below.    The Medical Record contains the following:     Indicators   Supporting Clinical Findings Location in Medical Record     x Documentation of condition Transferred from [facility] due to sacral ulcer and uti.    Urinary tract infection associated with catheterization of urinary tract     noticed to have increased urine sediment in Yadav  1/25 ED Provider Note        1/26 HM PN      x Urinary Device, Catheter Yadav present upon arrival with cloudy urine 1/25 ED Provider Note     x Lab Value(s)  01/26/24 03:00 01/27/24 04:14 01/28/24 03:37   WBC 21.60 (H) 14.99 (H) 14.19 (H)    Lab results     x UA Results  01/25/24 16:15   Color, UA Yellow   Appearance, UA Clear   Occult Blood UA Negative   NITRITE UA Negative   Bilirubin, UA Negative   Urobilinogen, UA 1+ !   Leukocytes,  !   RBC, UA 11-20 !   WBC, UA 51-99 !   Bacteria, UA None Seen    Lab results      x Cultures Urine culture:  10,000 - 25,000 colonies/ml Candida tropicalis   Less than 10,000 colonies/ml Proteus mirabilis  1/25 microbiology     x Treatment/Medication sodium chloride 0.9% bolus 1,000 mL IV x1   lactated ringers infusion continuous IV at 100ml/hr   piperacillin-tazobactam (ZOSYN) 4.5 g IVPB q8hrs  vancomycin (VANCOCIN) 1,500-2,000 mg q12hrs 1/25 medication  1/25-current medications    Other        Provider, please clarify if there is any clinical correlation between UTI and Yadav.  [  x ] Due to or associated with each other   [   ] Unrelated to each other   [   ] Other explanation (please specify): _____________    [   ] Clinically undetermined       Please document in your progress notes daily for the duration of treatment until resolved, and include in your discharge summary.    Form No. [id number]

## 2024-01-30 NOTE — PHYSICIAN QUERY
PT Name: Fermin Garcia  MR #: [id number]     DOCUMENTATION CLARIFICATION     CDS/: lCarisa Khan RN          Contact Information: [email or web address]   This form is a permanent document in the medical record.     Query Date: 2024    By submitting this query, we are merely seeking further clarification of documentation.  Please utilize your independent clinical judgment when addressing the question(s) below.  Provider, please provide the integumentary diagnosis related to the documentation of Left ischium:   The Medical Record contains the followin/25 Wound care consult  Unstagable pressure injury also noted to left ischium measuring about 3cm round.     Will defer plan of care to IM team as wound care options are limited due to overall clinical picture. Palliative care may be appropriate. A specialty air bed is ordered. New orders placed and wound care to continue to follow  Left ischium          Wound Care PN  Patient is seen at bedside to follow up on multiple pressure injuries present on admission. Patient has been on a specialty bed and allowing some turning by staff.   Pt remains incontinent of stool and while a noyola is in place, leakage noted likely related to urethral tear.   Due to continued bed bound status and reluctance to allow staff to move him, wound is unlikely to heal. Despite tube feedings pt also is unlikely to have the protein stores needed to heal such a large cavernous wound. Per IM note, plans for discussions about hospice which I feel would be most appropriate. Wound care to continue to follow.          The clinical guidelines noted are only a system guideline. It does not replace the providers clinical judgment.    Per the National Pressure Injury Advisory Panel:   A pressure injury is localized damage to the skin and underlying soft tissue usually over a bony prominence or related to a medical or other device. The injury can present as intact skin or  an open ulcer and may be painful. The injury occurs as a result of intense and/or prolonged pressure or pressure in combination with shear. The tolerance of soft tissue for pressure and shear may also be affected by microclimate, nutrition, perfusion, co-morbidities and condition of the soft tissue.       Stage 1 Pressure Injury:  Intact skin with a localized area of non-blanchable erythema, which may appear differently in darkly pigmented skin. Color changes do not include purple or maroon discoloration; these may indicate deep tissue pressure injury.    Stage 2 Pressure Injury:  Partial-thickness loss of skin with exposed dermis. The wound bed is viable, pink or red, moist, and may also present as an intact or ruptured serum-filled blister.    Stage 3 Pressure Injury:  Full-thickness loss of skin, in which adipose (fat) is visible in the ulcer and granulation tissue and epibole (rolled wound edges) are often present. Slough and/or eschar may be visible. Undermining and tunneling may occur.    Stage 4 Pressure Injury:  Full-thickness skin and tissue loss with exposed or directly palpable fascia, muscle, tendon, ligament, cartilage or bone in the ulcer. Slough and/or eschar may be visible. Epibole (rolled edges), undermining and/or tunneling often occur.    Unstageable Pressure Injury:  Full-thickness skin and tissue loss in which the extent of tissue damage within the ulcer cannot be confirmed because it is obscured by slough or eschar. If slough or eschar is removed, a Stage 3 or Stage 4 pressure injury will be revealed.        Provider, please provide the integumentary diagnosis related to the documentation of Left ischium:     [   x] Pressure Injury/Decubitus Ulcer, Unstageable   [   ] Other Integumentary Diagnosis (please specify):______________       Please document in your progress notes daily for the duration of treatment until resolved and include in your discharge summary.    Reference:    DALE Bocanegra.,  MARYSOL Pelletier., Goldberg, M., DALE Lopez, DALE Valentin, & FELISHA Johnson (2016). Revised National Pressure Ulcer Advisory Panel Pressure Injury Staging System: Revised Pressure Injury Staging System. J Wound Ostomy Continence Nurs, 43(6), 585-597. doi:10.1097/[id number]    Form No.61540

## 2024-02-02 ENCOUNTER — PATIENT OUTREACH (OUTPATIENT)
Dept: ADMINISTRATIVE | Facility: CLINIC | Age: 64
End: 2024-02-02
Payer: MEDICARE

## 2024-05-06 PROBLEM — A41.9 SEPSIS WITHOUT ACUTE ORGAN DYSFUNCTION: Status: RESOLVED | Noted: 2022-10-22 | Resolved: 2024-05-06
